# Patient Record
Sex: FEMALE | Race: WHITE | NOT HISPANIC OR LATINO | Employment: FULL TIME | URBAN - NONMETROPOLITAN AREA
[De-identification: names, ages, dates, MRNs, and addresses within clinical notes are randomized per-mention and may not be internally consistent; named-entity substitution may affect disease eponyms.]

---

## 2017-01-03 ENCOUNTER — OFFICE VISIT (OUTPATIENT)
Dept: OBSTETRICS AND GYNECOLOGY | Facility: CLINIC | Age: 37
End: 2017-01-03

## 2017-01-03 VITALS
BODY MASS INDEX: 22.45 KG/M2 | WEIGHT: 122 LBS | DIASTOLIC BLOOD PRESSURE: 80 MMHG | HEIGHT: 62 IN | SYSTOLIC BLOOD PRESSURE: 106 MMHG

## 2017-01-03 DIAGNOSIS — Z01.419 WELL WOMAN EXAM WITH ROUTINE GYNECOLOGICAL EXAM: Primary | ICD-10-CM

## 2017-01-03 DIAGNOSIS — Z30.49 ENCOUNTER FOR SURVEILLANCE OF OTHER CONTRACEPTIVE: ICD-10-CM

## 2017-01-03 DIAGNOSIS — F32.A DEPRESSION, UNSPECIFIED DEPRESSION TYPE: ICD-10-CM

## 2017-01-03 PROCEDURE — 87624 HPV HI-RISK TYP POOLED RSLT: CPT | Performed by: NURSE PRACTITIONER

## 2017-01-03 PROCEDURE — 99395 PREV VISIT EST AGE 18-39: CPT | Performed by: NURSE PRACTITIONER

## 2017-01-03 PROCEDURE — G0123 SCREEN CERV/VAG THIN LAYER: HCPCS | Performed by: NURSE PRACTITIONER

## 2017-01-03 RX ORDER — NORGESTIMATE AND ETHINYL ESTRADIOL 0.25-0.035
1 KIT ORAL DAILY
Qty: 1 TABLET | Refills: 12 | Status: SHIPPED | OUTPATIENT
Start: 2017-01-03 | End: 2017-10-09 | Stop reason: SDUPTHER

## 2017-01-03 RX ORDER — SERTRALINE HYDROCHLORIDE 25 MG/1
25 TABLET, FILM COATED ORAL DAILY
Qty: 30 TABLET | Refills: 11 | Status: SHIPPED | OUTPATIENT
Start: 2017-01-03 | End: 2017-11-29

## 2017-01-03 RX ORDER — MELATONIN
3000 DAILY
COMMUNITY

## 2017-01-03 NOTE — PROGRESS NOTES
Subjective        Amanda is a 36 y.o. female who presents for annual exam. The patient has no complaints today. The patient is sexually active. GYN screening history: . Amanda does  BSE and exercises.      . The patient wears seatbelts: yes. Amanda is doing well with BCP  And Zolft and wishes to continue both.  She is having  No side effects.        Menstrual History:  Cyclic menses          Sexually active, , male partner    Patient's last menstrual period was 12/26/2016.       The following portions of the patient's history were reviewed and updated as appropriate: allergies, current medications, past family history, past medical history, past social history, past surgical history and problem list.    Review of Systems   Constitutional: Negative for appetite change and diaphoresis.   HENT:   Negative for hearing loss and nosebleeds.    Eyes: Negative for eye problems.   Cardiovascular: Negative for chest pain and leg swelling.   Gastrointestinal: Negative for abdominal distention, blood in stool and diarrhea.   Endocrine: Negative for hot flashes.   Genitourinary: Negative for difficulty urinating and nocturia.    Musculoskeletal: Negative for arthralgias and flank pain.   Skin: Negative for itching and rash.   Neurological: Negative for numbness and seizures.   Hematological: Negative for adenopathy.   Psychiatric/Behavioral: Positive for depression (improved with Zoloft).            ObjectiveHead: Normocephalic, without obvious abnormality, atraumatic Neck: no adenopathy, no carotid bruit, no JVD, supple, symmetrical, trachea midline and thyroid not enlarged, symmetric, no tenderness/mass/nodules Physical Exam   Constitutional: She is oriented to person, place, and time. She appears well-developed and well-nourished. No distress.   Genitourinary: Vagina normal and uterus normal. Pelvic exam was performed with patient supine. There is no rash, tenderness or lesion on the right labia. There is no  "rash, tenderness or lesion on the left labia. No erythema in the vagina. No signs of injury around the vagina. No vaginal discharge found. Right adnexum does not display tenderness and palpable. Left adnexum does not display tenderness and palpable.   Cervix is not nulliparous. Cervix does not exhibit motion tenderness, discharge or friability.     Uterus is midaxial. Uterus is not exhibiting a mass or mobile. Rectal exam shows no mass.   Genitourinary Comments: BSU  Without lesions  Perineum is notmal and well supported  Urethra with lesions  Bladder nontender  Labia minora and  Majora  Normal wihout lesions   HENT:   Head: Normocephalic.   Eyes: Right eye exhibits no discharge. Left eye exhibits no discharge.   Neck: Neck supple. No thyromegaly present.   Cardiovascular: Normal rate.  Exam reveals no friction rub.    No murmur heard.  Pulmonary/Chest: Effort normal. No respiratory distress. She has no wheezes. Right breast exhibits no inverted nipple and no mass. Left breast exhibits no inverted nipple and no mass.   Abdominal: Soft. She exhibits no distension. There is no tenderness.   Musculoskeletal: She exhibits no edema or deformity.   Neurological: She is oriented to person, place, and time. No cranial nerve deficit. Coordination normal.   Psychiatric: She has a normal mood and affect. Her behavior is normal. Thought content normal.   Nursing note and vitals reviewed.          Visit Vitals   • /80 (BP Location: Left arm, Patient Position: Sitting, Cuff Size: Adult)   • Ht 62\" (157.5 cm)   • Wt 122 lb (55.3 kg)   • LMP 12/26/2016   • BMI 22.31 kg/m2             Assessment/Plan     Amanda was seen today for gynecologic exam.    Diagnoses and all orders for this visit:    Well woman exam with routine gynecological exam  -     Liquid-based Pap Smear, Screening    Encounter for surveillance of other contraceptive  -     norgestimate-ethinyl estradiol (SPRINTEC 28) 0.25-35 MG-MCG per tablet; Take 1 tablet " by mouth Daily.        Patient is counseled re: BCP use, risks/benifits, and side effects.      Depression, unspecified depression type  -     sertraline (ZOLOFT) 25 MG tablet; Take 1 tablet by mouth Daily.      Patient is counseled re: BCP use, risks/benifits, and side effects.

## 2017-01-03 NOTE — MR AVS SNAPSHOT
Amanda Beltran   1/3/2017 11:00 AM   Office Visit    Dept Phone:  [phone]   Encounter #:  [id number]    Provider:  SCOTT Mcdowell   Department:  [facility]                Your Full Care Plan              Your Updated Medication List          This list is accurate as of: 1/3/17 11:58 AM.  Always use your most recent med list.                cholecalciferol 1000 UNITS tablet   Commonly known as:  VITAMIN D3       sertraline 25 MG tablet   Commonly known as:  ZOLOFT   Take 1 tablet by mouth Daily.       SPRINTEC 28 PO       terconazole 0.4 % vaginal cream   Commonly known as:  TERAZOL 7   Insert 1 applicator into the vagina Every Night.               You Were Diagnosed With        Codes Comments    Well woman exam with routine gynecological exam    -  Primary ICD-10-CM: Z01.419  ICD-9-CM: V72.31     Encounter for surveillance of other contraceptive     ICD-10-CM: Z30.49       Instructions     None    Patient Instructions History      Upcoming Appointments     Visit Type Date Time Department    PHYSICAL 1/3/2017 11:00 AM [facility] Signup     Our records indicate that you have an active [facility] V[facility] account.    You can view your After Visit Summary by going to [employer] and logging in with your Vator username and password.  If you don't have a Vator username and password but a parent or guardian has access to your record, the parent or guardian should login with their own Vator username and password and access your record to view the After Visit Summary.    If you have questions, you can email [email or web address] or call [phone] to talk to our Vator staff.  Remember, Vator is NOT to be used for urgent needs.  For medical emergencies, dial 911.               Other Info from Your Visit           Allergies     Sulfa Antibiotics      HIVES      Reason for Visit     Gynecologic Exam no  "problems, just finishing up period.      Vital Signs     Blood Pressure Height Weight Last Menstrual Period Body Mass Index Smoking Status    106/80 (BP Location: Left arm, Patient Position: Sitting, Cuff Size: Adult) 62\" (157.5 cm) 122 lb (55.3 kg) 12/26/2016 22.31 kg/m2 Never Smoker      Problems and Diagnoses Noted     Well woman exam with routine gynecological exam    -  Primary    Surveillance of previously prescribed contraceptive method            "

## 2017-01-11 LAB
GEN CATEG CVX/VAG CYTO-IMP: ABNORMAL
HPV REFLEX?: ABNORMAL
LAB AP CASE REPORT: ABNORMAL
LAB AP GYN ADDITIONAL INFORMATION: ABNORMAL
LAB AP GYN HPV REPORT,ADDENDUM: ABNORMAL
Lab: ABNORMAL
PATH INTERP SPEC-IMP: ABNORMAL
STAT OF ADQ CVX/VAG CYTO-IMP: ABNORMAL

## 2017-02-02 DIAGNOSIS — R53.83 FATIGUE, UNSPECIFIED TYPE: Primary | ICD-10-CM

## 2017-03-16 PROCEDURE — 88305 TISSUE EXAM BY PATHOLOGIST: CPT | Performed by: DERMATOLOGY

## 2017-03-17 ENCOUNTER — LAB REQUISITION (OUTPATIENT)
Dept: LAB | Facility: HOSPITAL | Age: 37
End: 2017-03-17

## 2017-03-17 ENCOUNTER — TELEPHONE (OUTPATIENT)
Dept: OBSTETRICS AND GYNECOLOGY | Facility: CLINIC | Age: 37
End: 2017-03-17

## 2017-03-17 DIAGNOSIS — Z00.00 ENCOUNTER FOR GENERAL ADULT MEDICAL EXAMINATION WITHOUT ABNORMAL FINDINGS: ICD-10-CM

## 2017-03-17 NOTE — TELEPHONE ENCOUNTER
Patient states that she actually forgot about labs. Will leave orders there for her to do a walk in.

## 2017-03-20 LAB
CYTO UR: NORMAL
LAB AP CASE REPORT: NORMAL
LAB AP CLINICAL INFORMATION: NORMAL
Lab: NORMAL
PATH REPORT.FINAL DX SPEC: NORMAL
PATH REPORT.GROSS SPEC: NORMAL

## 2017-04-13 ENCOUNTER — OFFICE VISIT (OUTPATIENT)
Dept: OBSTETRICS AND GYNECOLOGY | Facility: CLINIC | Age: 37
End: 2017-04-13

## 2017-04-13 ENCOUNTER — PROCEDURE VISIT (OUTPATIENT)
Dept: OBSTETRICS AND GYNECOLOGY | Facility: CLINIC | Age: 37
End: 2017-04-13

## 2017-04-13 VITALS
DIASTOLIC BLOOD PRESSURE: 64 MMHG | SYSTOLIC BLOOD PRESSURE: 104 MMHG | BODY MASS INDEX: 22.82 KG/M2 | HEIGHT: 62 IN | WEIGHT: 124 LBS

## 2017-04-13 DIAGNOSIS — R35.0 URINARY FREQUENCY: ICD-10-CM

## 2017-04-13 DIAGNOSIS — N93.8 DUB (DYSFUNCTIONAL UTERINE BLEEDING): Primary | ICD-10-CM

## 2017-04-13 DIAGNOSIS — B37.9 CANDIDIASIS: ICD-10-CM

## 2017-04-13 LAB — WET PREP GENITAL: ABNORMAL

## 2017-04-13 PROCEDURE — 87210 SMEAR WET MOUNT SALINE/INK: CPT | Performed by: NURSE PRACTITIONER

## 2017-04-13 PROCEDURE — 99213 OFFICE O/P EST LOW 20 MIN: CPT | Performed by: NURSE PRACTITIONER

## 2017-04-13 PROCEDURE — 76830 TRANSVAGINAL US NON-OB: CPT | Performed by: OBSTETRICS & GYNECOLOGY

## 2017-04-13 RX ORDER — CHLORAL HYDRATE 500 MG
CAPSULE ORAL
COMMUNITY
End: 2019-12-24

## 2017-04-13 RX ORDER — FLUCONAZOLE 150 MG/1
150 TABLET ORAL ONCE
Qty: 2 TABLET | Refills: 0 | Status: SHIPPED | OUTPATIENT
Start: 2017-04-13 | End: 2017-04-13

## 2017-04-13 NOTE — PROGRESS NOTES
Subjective   Amanda Beltran is a 36 y.o. female.     HPI Comments: Here for C/O DUB.    Menstrual Problem   This is a new problem. Pertinent negatives include no abdominal pain, anorexia, chills, fever, headaches, nausea, rash, sore throat or vomiting.   Female  Problem   The patient's primary symptoms include pelvic pain and vaginal bleeding. The patient's pertinent negatives include no genital itching, genital lesions, genital odor, genital rash, missed menses or vaginal discharge. This is a new problem. The current episode started 1 to 4 weeks ago. The problem occurs constantly. The problem has been waxing and waning. The pain is severe. The problem affects the right side. She is not pregnant. Pertinent negatives include no abdominal pain, anorexia, back pain, chills, constipation, diarrhea, discolored urine, dysuria, fever, flank pain, frequency, headaches, hematuria, joint pain, joint swelling, nausea, painful intercourse, rash, sore throat, urgency or vomiting. The vaginal bleeding is spotting. She has not been passing clots. She has not been passing tissue. Nothing aggravates the symptoms. She is sexually active. No, her partner does not have an STD. She uses oral contraceptives for contraception. Her menstrual history has been regular.       The following portions of the patient's history were reviewed and updated as appropriate: allergies, current medications, past family history, past medical history, past social history, past surgical history and problem list.    Review of Systems   Constitutional: Negative for chills and fever.   HENT: Negative for sore throat.    Gastrointestinal: Negative for abdominal pain, anorexia, constipation, diarrhea, nausea and vomiting.   Genitourinary: Positive for menstrual problem and pelvic pain. Negative for dysuria, flank pain, frequency, hematuria, missed menses, urgency and vaginal discharge.   Musculoskeletal: Negative for back pain and joint pain.   Skin: Negative  for rash.   Neurological: Negative for headaches.       Objective   Physical Exam   Constitutional: She is oriented to person, place, and time. She appears well-developed and well-nourished.   HENT:   Head: Normocephalic and atraumatic.   Abdominal: Soft. She exhibits no distension. There is no tenderness.   Genitourinary: Vagina normal and uterus normal. There is no rash, tenderness, lesion or injury on the right labia. There is no rash, tenderness, lesion or injury on the left labia. Uterus is not enlarged and not tender. Cervix exhibits no motion tenderness, no discharge and no friability. Right adnexum displays no mass, no tenderness and no fullness. Left adnexum displays no mass, no tenderness and no fullness. No erythema, tenderness or bleeding in the vagina. No vaginal discharge found.   Genitourinary Comments: Small amt of dark blood noted.   Neurological: She is alert and oriented to person, place, and time.   Skin: Skin is warm and dry.   Psychiatric: She has a normal mood and affect. Her behavior is normal. Judgment and thought content normal.   Nursing note and vitals reviewed.      Assessment/Plan   Amanda was seen today for menstrual problem.    Diagnoses and all orders for this visit:    DUB (dysfunctional uterine bleeding)  Comments:  Will get Pelvic U/S.    Candidiasis  Comments:  + Yeast on wet smear.  Orders:  -     fluconazole (DIFLUCAN) 150 MG tablet; Take 1 tablet by mouth 1 (One) Time for 1 dose. Take once a week for 2 weeks.  -     POC Wet Prep    Urinary frequency  Comments:  urine sent for U/A and C&S.  Orders:  -     Urinalysis With / Microscopic If Indicated  -     Urine Culture

## 2017-04-14 ENCOUNTER — TELEPHONE (OUTPATIENT)
Dept: OBSTETRICS AND GYNECOLOGY | Facility: CLINIC | Age: 37
End: 2017-04-14

## 2017-04-14 DIAGNOSIS — E28.2 PCOS (POLYCYSTIC OVARIAN SYNDROME): Primary | ICD-10-CM

## 2017-04-14 NOTE — TELEPHONE ENCOUNTER
Can you please let her know her U/S looks like she has PCOS. She will need to come in for fasting lab work to check her levels.

## 2017-04-15 LAB
APPEARANCE UR: CLEAR
BACTERIA #/AREA URNS HPF: NORMAL /HPF
BACTERIA UR CULT: NORMAL
BACTERIA UR CULT: NORMAL
BILIRUB UR QL STRIP: NEGATIVE
CASTS URNS MICRO: NORMAL
COLOR UR: YELLOW
EPI CELLS #/AREA URNS HPF: NORMAL /HPF
GLUCOSE UR QL: NEGATIVE
HGB UR QL STRIP: ABNORMAL
KETONES UR QL STRIP: NEGATIVE
LEUKOCYTE ESTERASE UR QL STRIP: NEGATIVE
NITRITE UR QL STRIP: NEGATIVE
PH UR STRIP: 7 [PH] (ref 5–8)
PROT UR QL STRIP: NEGATIVE
RBC #/AREA URNS HPF: NORMAL /HPF
SP GR UR: ABNORMAL (ref 1–1.03)
UROBILINOGEN UR STRIP-MCNC: ABNORMAL MG/DL
WBC #/AREA URNS HPF: NORMAL /HPF

## 2017-04-18 DIAGNOSIS — Z01.419 WELL WOMAN EXAM WITH ROUTINE GYNECOLOGICAL EXAM: Primary | ICD-10-CM

## 2017-04-19 LAB
25(OH)D3+25(OH)D2 SERPL-MCNC: 65.2 NG/ML (ref 30–100)
ALBUMIN SERPL-MCNC: 4.1 G/DL (ref 3.5–5)
ALBUMIN/GLOB SERPL: 1.5 G/DL (ref 1.1–2.5)
ALP SERPL-CCNC: 37 U/L (ref 24–120)
ALT SERPL-CCNC: 19 U/L (ref 0–54)
AST SERPL-CCNC: 30 U/L (ref 7–45)
BASOPHILS # BLD AUTO: 0.02 10*3/MM3 (ref 0–0.2)
BASOPHILS NFR BLD AUTO: 0.5 % (ref 0–2)
BILIRUB SERPL-MCNC: 0.5 MG/DL (ref 0.1–1)
BUN SERPL-MCNC: 13 MG/DL (ref 5–21)
BUN/CREAT SERPL: 18.3 (ref 7–25)
CALCIUM SERPL-MCNC: 9.7 MG/DL (ref 8.4–10.4)
CHLORIDE SERPL-SCNC: 103 MMOL/L (ref 98–110)
CHOLEST SERPL-MCNC: 163 MG/DL (ref 130–200)
CO2 SERPL-SCNC: 27 MMOL/L (ref 24–31)
CORTIS SERPL-MCNC: 26.6 UG/DL
CREAT SERPL-MCNC: 0.71 MG/DL (ref 0.5–1.4)
DHEA-S SERPL-MCNC: 115.7 UG/DL (ref 84.8–378)
EOSINOPHIL # BLD AUTO: 0.06 10*3/MM3 (ref 0–0.7)
EOSINOPHIL NFR BLD AUTO: 1.5 % (ref 0–4)
ERYTHROCYTE [DISTWIDTH] IN BLOOD BY AUTOMATED COUNT: 12.3 % (ref 12–15)
ESTRADIOL SERPL-MCNC: 9 PG/ML
FSH SERPL-ACNC: 5.8 MIU/ML
GLOBULIN SER CALC-MCNC: 2.8 GM/DL
GLUCOSE SERPL-MCNC: 86 MG/DL (ref 70–100)
HBA1C MFR BLD: 5 %
HCT VFR BLD AUTO: 40.7 % (ref 37–47)
HDLC SERPL-MCNC: 78 MG/DL
HGB BLD-MCNC: 13.4 G/DL (ref 12–16)
IMM GRANULOCYTES # BLD: 0 10*3/MM3 (ref 0–0.03)
IMM GRANULOCYTES NFR BLD: 0 % (ref 0–5)
INSULIN SERPL-ACNC: 4.5 UIU/ML (ref 2.6–24.9)
LDLC SERPL CALC-MCNC: 73 MG/DL (ref 0–99)
LDLC/HDLC SERPL: 0.93 {RATIO}
LH SERPL-ACNC: 6.5 MIU/ML
LYMPHOCYTES # BLD AUTO: 1.62 10*3/MM3 (ref 0.72–4.86)
LYMPHOCYTES NFR BLD AUTO: 40.5 % (ref 15–45)
MCH RBC QN AUTO: 30.3 PG (ref 28–32)
MCHC RBC AUTO-ENTMCNC: 32.9 G/DL (ref 33–36)
MCV RBC AUTO: 92.1 FL (ref 82–98)
MONOCYTES # BLD AUTO: 0.3 10*3/MM3 (ref 0.19–1.3)
MONOCYTES NFR BLD AUTO: 7.5 % (ref 4–12)
NEUTROPHILS # BLD AUTO: 2 10*3/MM3 (ref 1.87–8.4)
NEUTROPHILS NFR BLD AUTO: 50 % (ref 39–78)
PLATELET # BLD AUTO: 214 10*3/MM3 (ref 130–400)
POTASSIUM SERPL-SCNC: 5 MMOL/L (ref 3.5–5.3)
PROGEST SERPL-MCNC: <0.1 NG/ML
PROT SERPL-MCNC: 6.9 G/DL (ref 6.3–8.7)
RBC # BLD AUTO: 4.42 10*6/MM3 (ref 4.2–5.4)
SODIUM SERPL-SCNC: 140 MMOL/L (ref 135–145)
T3RU NFR SERPL: 24 % (ref 24–39)
T4 FREE SERPL-MCNC: 0.95 NG/DL (ref 0.78–2.19)
TESTOST SERPL-MCNC: 12 NG/DL (ref 8–48)
TRIGL SERPL-MCNC: 61 MG/DL (ref 0–149)
TSH SERPL DL<=0.005 MIU/L-ACNC: 0.83 MIU/ML (ref 0.47–4.68)
VLDLC SERPL CALC-MCNC: 12.2 MG/DL
WBC # BLD AUTO: 4 10*3/MM3 (ref 4.8–10.8)

## 2017-05-09 PROBLEM — R58 BLEEDING: Status: ACTIVE | Noted: 2017-03-27

## 2017-05-09 RX ORDER — SERTRALINE HYDROCHLORIDE 25 MG/1
TABLET, FILM COATED ORAL
COMMUNITY
End: 2017-05-09

## 2017-05-10 ENCOUNTER — OFFICE VISIT (OUTPATIENT)
Dept: OBSTETRICS AND GYNECOLOGY | Facility: CLINIC | Age: 37
End: 2017-05-10

## 2017-05-10 VITALS
WEIGHT: 124 LBS | DIASTOLIC BLOOD PRESSURE: 66 MMHG | HEIGHT: 62 IN | BODY MASS INDEX: 22.82 KG/M2 | SYSTOLIC BLOOD PRESSURE: 116 MMHG

## 2017-05-10 DIAGNOSIS — N93.8 DUB (DYSFUNCTIONAL UTERINE BLEEDING): Primary | ICD-10-CM

## 2017-05-10 PROCEDURE — 99212 OFFICE O/P EST SF 10 MIN: CPT | Performed by: NURSE PRACTITIONER

## 2017-10-09 DIAGNOSIS — Z30.49 ENCOUNTER FOR SURVEILLANCE OF OTHER CONTRACEPTIVE: ICD-10-CM

## 2017-10-09 RX ORDER — NORGESTIMATE AND ETHINYL ESTRADIOL 0.25-0.035
1 KIT ORAL DAILY
Qty: 1 TABLET | Refills: 0 | Status: SHIPPED | OUTPATIENT
Start: 2017-10-09 | End: 2018-03-07
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
## 2018-01-23 DIAGNOSIS — Z30.49 ENCOUNTER FOR SURVEILLANCE OF OTHER CONTRACEPTIVE: ICD-10-CM

## 2018-03-07 ENCOUNTER — OFFICE VISIT (OUTPATIENT)
Dept: OBSTETRICS AND GYNECOLOGY | Facility: CLINIC | Age: 38
End: 2018-03-07

## 2018-03-07 VITALS
BODY MASS INDEX: 23.75 KG/M2 | WEIGHT: 121 LBS | SYSTOLIC BLOOD PRESSURE: 104 MMHG | HEIGHT: 60 IN | DIASTOLIC BLOOD PRESSURE: 56 MMHG

## 2018-03-07 DIAGNOSIS — Z01.419 WELL WOMAN EXAM WITH ROUTINE GYNECOLOGICAL EXAM: Primary | ICD-10-CM

## 2018-03-07 DIAGNOSIS — Z30.41 ENCOUNTER FOR SURVEILLANCE OF CONTRACEPTIVE PILLS: ICD-10-CM

## 2018-03-07 PROCEDURE — 87512 GARDNER VAG DNA QUANT: CPT | Performed by: NURSE PRACTITIONER

## 2018-03-07 PROCEDURE — G0123 SCREEN CERV/VAG THIN LAYER: HCPCS | Performed by: NURSE PRACTITIONER

## 2018-03-07 PROCEDURE — 87481 CANDIDA DNA AMP PROBE: CPT | Performed by: NURSE PRACTITIONER

## 2018-03-07 PROCEDURE — 87798 DETECT AGENT NOS DNA AMP: CPT | Performed by: NURSE PRACTITIONER

## 2018-03-07 PROCEDURE — 99395 PREV VISIT EST AGE 18-39: CPT | Performed by: NURSE PRACTITIONER

## 2018-03-07 PROCEDURE — 87661 TRICHOMONAS VAGINALIS AMPLIF: CPT | Performed by: NURSE PRACTITIONER

## 2018-03-07 RX ORDER — NORGESTIMATE AND ETHINYL ESTRADIOL 0.25-0.035
1 KIT ORAL DAILY
COMMUNITY
End: 2018-03-07 | Stop reason: SDUPTHER

## 2018-03-07 RX ORDER — NORGESTIMATE AND ETHINYL ESTRADIOL 0.25-0.035
1 KIT ORAL DAILY
Qty: 28 TABLET | Refills: 12 | Status: SHIPPED | OUTPATIENT
Start: 2018-03-07 | End: 2019-03-11 | Stop reason: SDUPTHER

## 2018-03-07 NOTE — PROGRESS NOTES
"Yvette Beltran is a 37 y.o. female    HPI Comments: Here for yearly check up. Has no complaints.    Gynecologic Exam   The patient's pertinent negatives include no pelvic pain, vaginal bleeding or vaginal discharge. The patient is experiencing no pain. Pertinent negatives include no abdominal pain, anorexia, back pain, chills, constipation, diarrhea, discolored urine, dysuria, fever, flank pain, frequency, headaches, hematuria, joint pain, joint swelling, nausea, painful intercourse, rash, sore throat, urgency or vomiting. Nothing aggravates the symptoms. She is sexually active. She uses oral contraceptives for contraception. Her menstrual history has been regular.         /56  Ht 152.4 cm (60\")  Wt 54.9 kg (121 lb)  LMP 02/17/2018 (Exact Date)  BMI 23.63 kg/m2    Outpatient Encounter Prescriptions as of 3/7/2018   Medication Sig Dispense Refill   • cholecalciferol (VITAMIN D3) 1000 UNITS tablet Take 1,000 Units by mouth 3 (Three) Times a Day.     • Cyanocobalamin (VITAMIN B 12) 100 MCG lozenge Take  by mouth.     • Magnesium 100 MG capsule Take  by mouth.     • norgestimate-ethinyl estradiol (SPRINTEC 28) 0.25-35 MG-MCG per tablet Take 1 tablet by mouth Daily. 28 tablet 12   • Omega-3 Fatty Acids (FISH OIL) 1000 MG capsule capsule Take  by mouth Daily With Breakfast.     • [DISCONTINUED] Norethin-Eth Estrad-Fe Biphas (LO LOESTRIN FE PO)      • [DISCONTINUED] norgestimate-ethinyl estradiol (SPRINTEC 28) 0.25-35 MG-MCG per tablet Take 1 tablet by mouth Daily.     • [DISCONTINUED] cyclobenzaprine (FLEXERIL) 10 MG tablet 1 to 2 tabs q hs prn 30 tablet 0   • [DISCONTINUED] norgestimate-ethinyl estradiol (SPRINTEC 28) 0.25-35 MG-MCG per tablet Take 1 tablet by mouth Daily. 1 tablet 0   • [DISCONTINUED] SPRINTEC 28 0.25-35 MG-MCG per tablet TAKE 1 TABLET BY MOUTH DAILY. 28 tablet 12   • [DISCONTINUED] terconazole (TERAZOL 7) 0.4 % vaginal cream Insert 1 applicator into the vagina Every Night. 7 " each 1     No facility-administered encounter medications on file as of 3/7/2018.        Surgical History  Past Surgical History:   Procedure Laterality Date   • APPENDECTOMY     • BREAST AUGMENTATION     •  SECTION     • HAND SURGERY      CUT HERSELF WITH KNIFE   • TONSILLECTOMY     • VAGINAL BIRTH AFTER  SECTION         Family History  Family History   Problem Relation Age of Onset   • Cirrhosis Mother    • No Known Problems Father    • No Known Problems Sister    • No Known Problems Brother    • Prostate cancer Maternal Grandfather    • Breast cancer Neg Hx    • Ovarian cancer Neg Hx    • Uterine cancer Neg Hx    • Colon cancer Neg Hx    • Melanoma Neg Hx        The following portions of the patient's history were reviewed and updated as appropriate: allergies, current medications, past family history, past medical history, past social history, past surgical history and problem list.    Review of Systems   Constitutional: Negative for activity change, appetite change, chills, diaphoresis, fatigue, fever and unexpected weight change.   HENT: Negative for congestion, dental problem, drooling, ear discharge, ear pain, facial swelling, hearing loss, mouth sores, nosebleeds, postnasal drip, rhinorrhea, sinus pain, sinus pressure, sneezing, sore throat, tinnitus, trouble swallowing and voice change.    Eyes: Negative for photophobia, pain, discharge, redness, itching and visual disturbance.   Respiratory: Negative for apnea, cough, choking, chest tightness, shortness of breath, wheezing and stridor.    Cardiovascular: Negative for chest pain, palpitations and leg swelling.   Gastrointestinal: Negative for abdominal distention, abdominal pain, anal bleeding, anorexia, blood in stool, constipation, diarrhea, nausea, rectal pain and vomiting.   Endocrine: Negative for cold intolerance, heat intolerance, polydipsia, polyphagia and polyuria.   Genitourinary: Negative for decreased urine volume, difficulty  urinating, dyspareunia, dysuria, enuresis, flank pain, frequency, genital sores, hematuria, menstrual problem, pelvic pain, urgency, vaginal bleeding, vaginal discharge and vaginal pain.   Musculoskeletal: Negative for arthralgias, back pain, gait problem, joint pain, joint swelling, myalgias, neck pain and neck stiffness.   Skin: Negative for color change, pallor, rash and wound.   Allergic/Immunologic: Negative for environmental allergies, food allergies and immunocompromised state.   Neurological: Negative for dizziness, tremors, seizures, syncope, facial asymmetry, speech difficulty, weakness, light-headedness, numbness and headaches.   Hematological: Negative for adenopathy. Does not bruise/bleed easily.   Psychiatric/Behavioral: Negative for agitation, behavioral problems, confusion, decreased concentration, dysphoric mood, hallucinations, self-injury, sleep disturbance and suicidal ideas. The patient is not nervous/anxious and is not hyperactive.        Objective   Physical Exam   Constitutional: She is oriented to person, place, and time. She appears well-developed and well-nourished. No distress.   HENT:   Head: Normocephalic.   Right Ear: External ear normal.   Left Ear: External ear normal.   Nose: Nose normal.   Mouth/Throat: Oropharynx is clear and moist.   Eyes: Conjunctivae are normal. Right eye exhibits no discharge. Left eye exhibits no discharge. No scleral icterus.   Neck: Normal range of motion. Neck supple. Carotid bruit is not present. No tracheal deviation present. No thyromegaly present.   Cardiovascular: Normal rate, regular rhythm, normal heart sounds and intact distal pulses.    No murmur heard.  Pulmonary/Chest: Effort normal and breath sounds normal. No respiratory distress. She has no wheezes. Right breast exhibits no inverted nipple, no mass, no nipple discharge, no skin change and no tenderness. Left breast exhibits no inverted nipple, no mass, no nipple discharge, no skin change and  no tenderness. Breasts are symmetrical. There is no breast swelling.   Abdominal: Soft. She exhibits no distension and no mass. There is no tenderness. There is no guarding. No hernia. Hernia confirmed negative in the right inguinal area and confirmed negative in the left inguinal area.   Genitourinary: Rectum normal and uterus normal. Rectal exam shows no mass. No breast tenderness, discharge or bleeding. Pelvic exam was performed with patient supine. There is no rash, tenderness, lesion or injury on the right labia. There is no rash, tenderness, lesion or injury on the left labia. Uterus is not enlarged, not fixed and not tender. Cervix exhibits no motion tenderness, no discharge and no friability. Right adnexum displays no mass, no tenderness and no fullness. Left adnexum displays no mass, no tenderness and no fullness. No erythema, tenderness or bleeding in the vagina. No foreign body in the vagina. No signs of injury around the vagina. Vaginal discharge found.   Genitourinary Comments:   BSU normal  Urethral meatus  Normal  Perineum  Normal   Musculoskeletal: Normal range of motion. She exhibits no edema or tenderness.   Lymphadenopathy:        Head (right side): No submental, no submandibular, no tonsillar, no preauricular, no posterior auricular and no occipital adenopathy present.        Head (left side): No submental, no submandibular, no tonsillar, no preauricular, no posterior auricular and no occipital adenopathy present.     She has no cervical adenopathy.        Right cervical: No superficial cervical, no deep cervical and no posterior cervical adenopathy present.       Left cervical: No superficial cervical, no deep cervical and no posterior cervical adenopathy present.     She has no axillary adenopathy.        Right: No inguinal adenopathy present.        Left: No inguinal adenopathy present.   Neurological: She is alert and oriented to person, place, and time. Coordination normal.   Skin: Skin is  warm and dry. No bruising and no rash noted. She is not diaphoretic. No erythema.   Psychiatric: She has a normal mood and affect. Her behavior is normal. Judgment and thought content normal.   Nursing note and vitals reviewed.      Assessment/Plan   Amanda was seen today for gynecologic exam.    Diagnoses and all orders for this visit:    Well woman exam with routine gynecological exam  Normal GYN exam. Will have lab work today. Encouraged SBE, pt is aware how to do self breast exam and the importance of same. Discussed weight management and importance of maintaining a healthy weight. Discussed Vitamin D intake and the importance of adequate vitamin D for both Bone Health and a healthy immune system.  Discussed Daily exercise and the importance of same, in regards to a healthy heart as well as helping to maintain her weight and improving her mental health.  BMI 23.6  M[facility] will be scheduled at [facility]. Pap smear is done per ASCCP guidelines.  -     Liquid-based Pap Smear, Screening  -     CBC & Differential  -     Comprehensive Metabolic Panel  -     Lipid Panel With LDL / HDL Ratio  -     TSH  -     T3, Uptake  -     Vitamin D 25 Hydroxy  -     T4, Free  -     Hemoglobin A1c  -     UA / M With / Rflx Culture([facility] ONLY) - Urine, Clean Catch  -     Vitamin B12  -     Mammo Screening Digital Tomosynthesis Bilateral With CAD; Future    Encounter for surveillance of contraceptive pills  Comments:  Doing well with OC's.  Orders:  -     norgestimate-ethinyl estradiol (SPRINTEC 28) 0.25-35 MG-MCG per tablet; Take 1 tablet by mouth Daily.    Vaginal Discharge  BV panel added to pap.      BMI 23.6  Patient's BMI is within normal parameters. No follow-up required.      Juli Simon, APRN  3/7/2018

## 2018-03-08 LAB
25(OH)D3+25(OH)D2 SERPL-MCNC: 73.1 NG/ML (ref 30–100)
ALBUMIN SERPL-MCNC: 4.6 G/DL (ref 3.5–5)
ALBUMIN/GLOB SERPL: 1.5 G/DL (ref 1.1–2.5)
ALP SERPL-CCNC: 42 U/L (ref 24–120)
ALT SERPL-CCNC: 24 U/L (ref 0–54)
APPEARANCE UR: CLEAR
AST SERPL-CCNC: 27 U/L (ref 7–45)
BACTERIA #/AREA URNS HPF: NORMAL /HPF
BASOPHILS # BLD AUTO: 0.05 10*3/MM3 (ref 0–0.2)
BASOPHILS NFR BLD AUTO: 0.6 % (ref 0–2)
BILIRUB SERPL-MCNC: 0.5 MG/DL (ref 0.1–1)
BILIRUB UR QL STRIP: NEGATIVE
BUN SERPL-MCNC: 13 MG/DL (ref 5–21)
BUN/CREAT SERPL: 19.4 (ref 7–25)
CALCIUM SERPL-MCNC: 10.1 MG/DL (ref 8.4–10.4)
CHLORIDE SERPL-SCNC: 101 MMOL/L (ref 98–110)
CHOLEST SERPL-MCNC: 161 MG/DL (ref 130–200)
CO2 SERPL-SCNC: 29 MMOL/L (ref 24–31)
COLOR UR: YELLOW
CREAT SERPL-MCNC: 0.67 MG/DL (ref 0.5–1.4)
EOSINOPHIL # BLD AUTO: 0.02 10*3/MM3 (ref 0–0.7)
EOSINOPHIL NFR BLD AUTO: 0.2 % (ref 0–4)
EPI CELLS #/AREA URNS HPF: NORMAL /HPF
ERYTHROCYTE [DISTWIDTH] IN BLOOD BY AUTOMATED COUNT: 11.9 % (ref 12–15)
GFR SERPLBLD CREATININE-BSD FMLA CKD-EPI: 100 ML/MIN/1.73
GFR SERPLBLD CREATININE-BSD FMLA CKD-EPI: 121 ML/MIN/1.73
GLOBULIN SER CALC-MCNC: 3 GM/DL
GLUCOSE SERPL-MCNC: 83 MG/DL (ref 70–100)
GLUCOSE UR QL: NEGATIVE
HBA1C MFR BLD: 4.7 %
HCT VFR BLD AUTO: 42.6 % (ref 37–47)
HDLC SERPL-MCNC: 86 MG/DL
HGB BLD-MCNC: 13.8 G/DL (ref 12–16)
HGB UR QL STRIP: NEGATIVE
IMM GRANULOCYTES # BLD: 0.02 10*3/MM3 (ref 0–0.03)
IMM GRANULOCYTES NFR BLD: 0.2 % (ref 0–5)
KETONES UR QL STRIP: NEGATIVE
LDLC SERPL CALC-MCNC: 55 MG/DL (ref 0–99)
LDLC/HDLC SERPL: 0.64 {RATIO}
LEUKOCYTE ESTERASE UR QL STRIP: NEGATIVE
LYMPHOCYTES # BLD AUTO: 1.63 10*3/MM3 (ref 0.72–4.86)
LYMPHOCYTES NFR BLD AUTO: 19.5 % (ref 15–45)
MCH RBC QN AUTO: 30.4 PG (ref 28–32)
MCHC RBC AUTO-ENTMCNC: 32.4 G/DL (ref 33–36)
MCV RBC AUTO: 93.8 FL (ref 82–98)
MICRO URNS: NORMAL
MICRO URNS: NORMAL
MONOCYTES # BLD AUTO: 0.43 10*3/MM3 (ref 0.19–1.3)
MONOCYTES NFR BLD AUTO: 5.1 % (ref 4–12)
MUCOUS THREADS URNS QL MICRO: PRESENT /HPF
NEUTROPHILS # BLD AUTO: 6.22 10*3/MM3 (ref 1.87–8.4)
NEUTROPHILS NFR BLD AUTO: 74.4 % (ref 39–78)
NITRITE UR QL STRIP: NEGATIVE
NRBC BLD AUTO-RTO: 0 /100 WBC (ref 0–0)
PH UR STRIP: 7.5 [PH] (ref 5–7.5)
PLATELET # BLD AUTO: 271 10*3/MM3 (ref 130–400)
POTASSIUM SERPL-SCNC: 4.3 MMOL/L (ref 3.5–5.3)
PROT SERPL-MCNC: 7.6 G/DL (ref 6.3–8.7)
PROT UR QL STRIP: NEGATIVE
RBC # BLD AUTO: 4.54 10*6/MM3 (ref 4.2–5.4)
RBC #/AREA URNS HPF: NORMAL /HPF
SODIUM SERPL-SCNC: 141 MMOL/L (ref 135–145)
SP GR UR: 1.02 (ref 1–1.03)
T3RU NFR SERPL: 25 % (ref 24–39)
T4 FREE SERPL-MCNC: 1.29 NG/DL (ref 0.78–2.19)
TRIGL SERPL-MCNC: 101 MG/DL (ref 0–149)
TSH SERPL DL<=0.005 MIU/L-ACNC: 0.66 MIU/ML (ref 0.47–4.68)
URINALYSIS REFLEX: NORMAL
UROBILINOGEN UR STRIP-MCNC: 0.2 MG/DL (ref 0.2–1)
VIT B12 SERPL-MCNC: 990 PG/ML (ref 239–931)
VLDLC SERPL CALC-MCNC: 20.2 MG/DL
WBC # BLD AUTO: 8.37 10*3/MM3 (ref 4.8–10.8)
WBC #/AREA URNS HPF: NORMAL /HPF

## 2018-03-11 LAB
GEN CATEG CVX/VAG CYTO-IMP: NORMAL
LAB AP CASE REPORT: NORMAL
LAB AP GYN ADDITIONAL INFORMATION: NORMAL
LAB AP GYN OTHER FINDINGS: NORMAL
Lab: NORMAL
PATH INTERP SPEC-IMP: NORMAL
STAT OF ADQ CVX/VAG CYTO-IMP: NORMAL

## 2018-03-12 ENCOUNTER — HOSPITAL ENCOUNTER (OUTPATIENT)
Dept: MAMMOGRAPHY | Facility: HOSPITAL | Age: 38
Discharge: HOME | End: 2018-03-12
Admitting: NURSE PRACTITIONER

## 2018-03-12 DIAGNOSIS — Z01.419 WELL WOMAN EXAM WITH ROUTINE GYNECOLOGICAL EXAM: ICD-10-CM

## 2018-03-12 PROCEDURE — 77063 BREAST TOMOSYNTHESIS BI: CPT

## 2018-03-12 PROCEDURE — 77067 SCR MAMMO BI INCL CAD: CPT

## 2019-03-11 ENCOUNTER — OFFICE VISIT (OUTPATIENT)
Dept: OBSTETRICS AND GYNECOLOGY | Facility: CLINIC | Age: 39
End: 2019-03-11

## 2019-03-11 VITALS
SYSTOLIC BLOOD PRESSURE: 118 MMHG | WEIGHT: 122 LBS | BODY MASS INDEX: 23.95 KG/M2 | DIASTOLIC BLOOD PRESSURE: 70 MMHG | HEIGHT: 60 IN

## 2019-03-11 DIAGNOSIS — Z30.41 ENCOUNTER FOR SURVEILLANCE OF CONTRACEPTIVE PILLS: ICD-10-CM

## 2019-03-11 DIAGNOSIS — Z01.419 WELL WOMAN EXAM WITH ROUTINE GYNECOLOGICAL EXAM: Primary | ICD-10-CM

## 2019-03-11 DIAGNOSIS — Z12.4 CERVICAL CANCER SCREENING: ICD-10-CM

## 2019-03-11 DIAGNOSIS — E55.9 VITAMIN D DEFICIENCY: ICD-10-CM

## 2019-03-11 DIAGNOSIS — R53.83 FATIGUE, UNSPECIFIED TYPE: ICD-10-CM

## 2019-03-11 DIAGNOSIS — R10.32 LLQ PAIN: ICD-10-CM

## 2019-03-11 DIAGNOSIS — N76.0 ACUTE VAGINITIS: ICD-10-CM

## 2019-03-11 PROBLEM — M26.609 TEMPOROMANDIBULAR JOINT DISORDER: Status: ACTIVE | Noted: 2018-06-12

## 2019-03-11 PROBLEM — R58 BLEEDING: Status: RESOLVED | Noted: 2017-03-27 | Resolved: 2019-03-11

## 2019-03-11 PROBLEM — F63.3 TRICHOTILLOMANIA: Status: ACTIVE | Noted: 2019-03-11

## 2019-03-11 PROCEDURE — 87624 HPV HI-RISK TYP POOLED RSLT: CPT | Performed by: NURSE PRACTITIONER

## 2019-03-11 PROCEDURE — 99213 OFFICE O/P EST LOW 20 MIN: CPT | Performed by: NURSE PRACTITIONER

## 2019-03-11 PROCEDURE — G0123 SCREEN CERV/VAG THIN LAYER: HCPCS | Performed by: NURSE PRACTITIONER

## 2019-03-11 PROCEDURE — 87798 DETECT AGENT NOS DNA AMP: CPT | Performed by: NURSE PRACTITIONER

## 2019-03-11 PROCEDURE — 87512 GARDNER VAG DNA QUANT: CPT | Performed by: NURSE PRACTITIONER

## 2019-03-11 PROCEDURE — 87481 CANDIDA DNA AMP PROBE: CPT | Performed by: NURSE PRACTITIONER

## 2019-03-11 PROCEDURE — 99395 PREV VISIT EST AGE 18-39: CPT | Performed by: NURSE PRACTITIONER

## 2019-03-11 PROCEDURE — 87661 TRICHOMONAS VAGINALIS AMPLIF: CPT | Performed by: NURSE PRACTITIONER

## 2019-03-11 RX ORDER — MULTIPLE VITAMINS W/ MINERALS TAB 9MG-400MCG
1 TAB ORAL DAILY
COMMUNITY

## 2019-03-11 RX ORDER — NORGESTIMATE AND ETHINYL ESTRADIOL 0.25-0.035
1 KIT ORAL DAILY
Qty: 28 TABLET | Refills: 12 | Status: SHIPPED | OUTPATIENT
Start: 2019-03-11 | End: 2020-01-17 | Stop reason: SDUPTHER

## 2019-03-11 NOTE — PROGRESS NOTES
Yvette Beltran is a 38 y.o. female  YOB: 1980        Chief Complaint   Patient presents with   • Gynecologic Exam     Est patient, new to [place],  here for yearly exam. Patient's last pap was done on 03/07/18. Patient is on Sprintec OCP and doing well with it. Patient needing refills of this sent to her pharmacy. Patient voices no complaints or concerns today.        Gynecologic Exam   The patient's pertinent negatives include no pelvic pain. Pertinent negatives include no abdominal pain, back pain, constipation, diarrhea, dysuria, fever, frequency, hematuria, nausea, rash, sore throat, urgency or vomiting.       The following portions of the patient's history were reviewed and updated as appropriate: allergies, current medications, past family history, past medical history, past social history, past surgical history and problem list.    Allergies   Allergen Reactions   • Sulfa Antibiotics Hives     HIVES         Past Medical History:   Diagnosis Date   • Irritable bowel disease    • Lactose intolerance        Family History   Problem Relation Age of Onset   • Cirrhosis Mother    • No Known Problems Father    • No Known Problems Sister    • No Known Problems Brother    • Prostate cancer Maternal Grandfather    • Breast cancer Maternal Aunt    • Ovarian cancer Neg Hx    • Uterine cancer Neg Hx    • Colon cancer Neg Hx    • Melanoma Neg Hx        Social History     Socioeconomic History   • Marital status:      Spouse name: Not on file   • Number of children: Not on file   • Years of education: Not on file   • Highest education level: Not on file   Social Needs   • Financial resource strain: Not on file   • Food insecurity - worry: Not on file   • Food insecurity - inability: Not on file   • Transportation needs - medical: Not on file   • Transportation needs - non-medical: Not on file   Occupational History   • Not on file   Tobacco Use   • Smoking status: Never Smoker   •  Smokeless tobacco: Never Used   Substance and Sexual Activity   • Alcohol use: Yes     Comment: occassional   • Drug use: No   • Sexual activity: Yes     Birth control/protection: OCP   Other Topics Concern   • Not on file   Social History Narrative   • Not on file         Current Outpatient Medications:   •  cholecalciferol (VITAMIN D3) 1000 UNITS tablet, Take 1,000 Units by mouth 3 (Three) Times a Day., Disp: , Rfl:   •  Cyanocobalamin (VITAMIN B 12) 100 MCG lozenge, Take  by mouth., Disp: , Rfl:   •  Magnesium 100 MG capsule, Take  by mouth., Disp: , Rfl:   •  Multiple Vitamins-Minerals (MULTIVITAMIN WITH MINERALS) tablet tablet, Take 1 tablet by mouth Daily., Disp: , Rfl:   •  Omega-3 Fatty Acids (FISH OIL) 1000 MG capsule capsule, Take  by mouth Daily With Breakfast., Disp: , Rfl:   •  norgestimate-ethinyl estradiol (SPRINTEC 28) 0.25-35 MG-MCG per tablet, Take 1 tablet by mouth Daily., Disp: 28 tablet, Rfl: 12    No LMP recorded. Patient is not currently having periods (Reason: Oral contraceptives).    Sexual History:         Could not be calculated    Past Surgical History:   Procedure Laterality Date   • APPENDECTOMY     • AUGMENTATION MAMMAPLASTY     • BREAST AUGMENTATION     •  SECTION     • HAND SURGERY      CUT HERSELF WITH KNIFE   • TONSILLECTOMY     • VAGINAL BIRTH AFTER  SECTION         Review of Systems   Constitutional: Negative for activity change, appetite change, fatigue, fever, unexpected weight gain and unexpected weight loss.   HENT: Negative for congestion, ear pain, hearing loss, nosebleeds, rhinorrhea, sore throat, tinnitus and trouble swallowing.    Eyes: Negative for blurred vision, pain, discharge, itching and visual disturbance.   Respiratory: Negative for apnea, chest tightness, shortness of breath and wheezing.    Cardiovascular: Negative for chest pain and leg swelling.   Gastrointestinal: Negative for abdominal pain, blood in stool, constipation, diarrhea,  nausea, vomiting and GERD.   Endocrine: Negative for heat intolerance, polydipsia and polyuria.   Genitourinary: Negative for urinary incontinence, decreased libido, difficulty urinating, dyspareunia, dysuria, frequency, genital sores, hematuria, menstrual problem, pelvic pain, urgency, vaginal pain and breast lump.   Musculoskeletal: Negative for arthralgias, back pain, joint swelling and myalgias.   Skin: Negative for color change, rash and skin lesions.   Allergic/Immunologic: Negative for environmental allergies, food allergies and immunocompromised state.   Neurological: Negative for dizziness, tremors, seizures, syncope, facial asymmetry, numbness and headache.   Hematological: Negative for adenopathy. Does not bruise/bleed easily.   Psychiatric/Behavioral: Negative for agitation, hallucinations, sleep disturbance, suicidal ideas and depressed mood. The patient is not nervous/anxious.        Objective   Physical Exam   Constitutional: She is oriented to person, place, and time. She appears well-developed and well-nourished. No distress.   HENT:   Head: Normocephalic.   Right Ear: External ear normal.   Left Ear: External ear normal.   Nose: Nose normal.   Mouth/Throat: Oropharynx is clear and moist.   Eyes: Conjunctivae are normal. Right eye exhibits no discharge. Left eye exhibits no discharge. No scleral icterus.   Neck: Normal range of motion. Neck supple. Carotid bruit is not present. No tracheal deviation present. No thyromegaly present.   Cardiovascular: Normal rate, regular rhythm, normal heart sounds and intact distal pulses.   No murmur heard.  Pulmonary/Chest: Effort normal and breath sounds normal. No respiratory distress. She has no wheezes. Right breast exhibits no inverted nipple, no mass, no nipple discharge, no skin change and no tenderness. Left breast exhibits no inverted nipple, no mass, no nipple discharge, no skin change and no tenderness. Breasts are symmetrical. There is no breast  swelling.   Abdominal: Soft. She exhibits no distension and no mass. There is no tenderness. There is no guarding. No hernia. Hernia confirmed negative in the right inguinal area and confirmed negative in the left inguinal area.   Genitourinary: Rectum normal, vagina normal and uterus normal. Rectal exam shows no mass. No breast tenderness, discharge or bleeding. Pelvic exam was performed with patient supine. There is no rash, tenderness, lesion or injury on the right labia. There is no rash, tenderness, lesion or injury on the left labia. Uterus is not enlarged, not fixed and not tender. Cervix exhibits no motion tenderness, no discharge and no friability. Right adnexum displays no mass, no tenderness and no fullness. Left adnexum displays no mass, no tenderness and no fullness. No erythema, tenderness or bleeding in the vagina. No foreign body in the vagina. No signs of injury around the vagina. No vaginal discharge found.   Genitourinary Comments:   BSU normal  Urethral meatus  Normal  Perineum  Normal   Musculoskeletal: Normal range of motion. She exhibits no edema or tenderness.   Lymphadenopathy:        Head (right side): No submental, no submandibular, no tonsillar, no preauricular, no posterior auricular and no occipital adenopathy present.        Head (left side): No submental, no submandibular, no tonsillar, no preauricular, no posterior auricular and no occipital adenopathy present.     She has no cervical adenopathy.        Right cervical: No superficial cervical, no deep cervical and no posterior cervical adenopathy present.       Left cervical: No superficial cervical, no deep cervical and no posterior cervical adenopathy present.     She has no axillary adenopathy.        Right: No inguinal adenopathy present.        Left: No inguinal adenopathy present.   Neurological: She is alert and oriented to person, place, and time. Coordination normal.   Skin: Skin is warm and dry. No bruising and no rash  "noted. She is not diaphoretic. No erythema.   Psychiatric: She has a normal mood and affect. Her behavior is normal. Judgment and thought content normal.   Nursing note and vitals reviewed.        Vitals:    03/11/19 0839   BP: 118/70   Weight: 55.3 kg (122 lb)   Height: 152.4 cm (60\")       Amanda was seen today for gynecologic exam.    Diagnoses and all orders for this visit:    Well woman exam with routine gynecological exam  Comments:  Normal well woman exam.  Thin prep pap smear done.    Orders:  -     Liquid-based Pap Smear, Screening  -     Vitamin D 25 Hydroxy  -     T4, Free  -     Hemoglobin A1c  -     CBC & Differential  -     Comprehensive Metabolic Panel  -     Lipid Panel With LDL / HDL Ratio  -     TSH    Cervical cancer screening  Comments:  Thin prep pap smear done.    Orders:  -     Liquid-based Pap Smear, Screening    Encounter for surveillance of contraceptive pills  Comments:  Doing well with OC's.  Orders:  -     norgestimate-ethinyl estradiol (SPRINTEC 28) 0.25-35 MG-MCG per tablet; Take 1 tablet by mouth Daily.    Acute vaginitis  Comments:  Patient reports she took 2 Diflucan 4 days ago but states she is still symptomatic.  BV panel added to thin prep pap smear - will f/u pending results.     Fatigue, unspecified type  Comments:  Yearly lab panel done.    Orders:  -     Vitamin D 25 Hydroxy  -     T4, Free  -     Hemoglobin A1c  -     CBC & Differential  -     Comprehensive Metabolic Panel  -     Lipid Panel With LDL / HDL Ratio  -     TSH    Vitamin D deficiency  Comments:  Vitamin D level to be rechecked today.   Orders:  -     Vitamin D 25 Hydroxy    LLQ pain  Comments:  Left ovary enlarged and tender.  To get pelvic US and will f/u pending results.           Patient's Body mass index is 23.83 kg/m². BMI is within normal parameters. No follow-up required..             Non-Smoker    MyChart Instructions Given       "

## 2019-03-12 LAB
HPV I/H RISK 4 DNA CVX QL PROBE+SIG AMP: NOT DETECTED
TRICHOMONAS VAGINALIS PCR: NOT DETECTED

## 2019-03-14 LAB
GEN CATEG CVX/VAG CYTO-IMP: ABNORMAL
LAB AP CASE REPORT: ABNORMAL
LAB AP GYN ADDITIONAL INFORMATION: ABNORMAL
LAB AP GYN OTHER FINDINGS: ABNORMAL
PATH INTERP SPEC-IMP: ABNORMAL
STAT OF ADQ CVX/VAG CYTO-IMP: ABNORMAL

## 2019-03-27 ENCOUNTER — OFFICE VISIT (OUTPATIENT)
Dept: OBSTETRICS AND GYNECOLOGY | Facility: CLINIC | Age: 39
End: 2019-03-27

## 2019-03-27 ENCOUNTER — PROCEDURE VISIT (OUTPATIENT)
Dept: OBSTETRICS AND GYNECOLOGY | Facility: CLINIC | Age: 39
End: 2019-03-27

## 2019-03-27 VITALS
SYSTOLIC BLOOD PRESSURE: 118 MMHG | BODY MASS INDEX: 23.95 KG/M2 | HEIGHT: 60 IN | DIASTOLIC BLOOD PRESSURE: 70 MMHG | WEIGHT: 122 LBS

## 2019-03-27 DIAGNOSIS — R10.32 LLQ PAIN: Primary | ICD-10-CM

## 2019-03-27 PROCEDURE — 99213 OFFICE O/P EST LOW 20 MIN: CPT | Performed by: NURSE PRACTITIONER

## 2019-03-27 PROCEDURE — 76830 TRANSVAGINAL US NON-OB: CPT | Performed by: OBSTETRICS & GYNECOLOGY

## 2019-03-27 NOTE — PROGRESS NOTES
Yvette Beltran is a 38 y.o. female  YOB: 1980      Chief Complaint   Patient presents with   • Pelvic Pain     Patient here for follow up on LLQ pain and enlarged ovary on pelvic exam on 03/11/19. Patient had US today.        Pelvic Pain   The patient's pertinent negatives include no pelvic pain or vaginal discharge. Pertinent negatives include no abdominal pain, back pain, constipation, diarrhea, dysuria, fever, frequency, headaches, nausea, rash, sore throat, urgency or vomiting.       The following portions of the patient's history were reviewed and updated as appropriate: allergies, current medications, past family history, past medical history, past social history, past surgical history and problem list.    Allergies   Allergen Reactions   • Sulfa Antibiotics Hives     HIVES         Past Medical History:   Diagnosis Date   • Irritable bowel disease    • Lactose intolerance        Family History   Problem Relation Age of Onset   • Cirrhosis Mother    • No Known Problems Father    • No Known Problems Sister    • No Known Problems Brother    • Prostate cancer Maternal Grandfather    • Breast cancer Maternal Aunt    • Ovarian cancer Neg Hx    • Uterine cancer Neg Hx    • Colon cancer Neg Hx    • Melanoma Neg Hx        Social History     Socioeconomic History   • Marital status:      Spouse name: Not on file   • Number of children: Not on file   • Years of education: Not on file   • Highest education level: Not on file   Tobacco Use   • Smoking status: Never Smoker   • Smokeless tobacco: Never Used   Substance and Sexual Activity   • Alcohol use: Yes     Comment: occassional   • Drug use: No   • Sexual activity: Yes     Birth control/protection: OCP         Current Outpatient Medications:   •  cholecalciferol (VITAMIN D3) 1000 UNITS tablet, Take 1,000 Units by mouth 3 (Three) Times a Day., Disp: , Rfl:   •  Cyanocobalamin (VITAMIN B 12) 100 MCG lozenge, Take  by mouth., Disp: ,  Rfl:   •  Magnesium 100 MG capsule, Take  by mouth., Disp: , Rfl:   •  Multiple Vitamins-Minerals (MULTIVITAMIN WITH MINERALS) tablet tablet, Take 1 tablet by mouth Daily., Disp: , Rfl:   •  norgestimate-ethinyl estradiol (SPRINTEC 28) 0.25-35 MG-MCG per tablet, Take 1 tablet by mouth Daily., Disp: 28 tablet, Rfl: 12  •  Omega-3 Fatty Acids (FISH OIL) 1000 MG capsule capsule, Take  by mouth Daily With Breakfast., Disp: , Rfl:     No LMP recorded. Patient is not currently having periods (Reason: Oral contraceptives).    Sexual History:         Could not be calculated    Past Surgical History:   Procedure Laterality Date   • APPENDECTOMY     • AUGMENTATION MAMMAPLASTY     • BREAST AUGMENTATION     •  SECTION     • HAND SURGERY      CUT HERSELF WITH KNIFE   • TONSILLECTOMY     • VAGINAL BIRTH AFTER  SECTION         Review of Systems   Constitutional: Negative for activity change, appetite change, fatigue and fever.   HENT: Negative for ear pain, hearing loss, sore throat and trouble swallowing.    Eyes: Negative for pain, discharge and visual disturbance.   Respiratory: Negative for apnea, cough, chest tightness and shortness of breath.    Cardiovascular: Negative for chest pain and palpitations.   Gastrointestinal: Negative for abdominal distention, abdominal pain, blood in stool, constipation, diarrhea, nausea and vomiting.   Endocrine: Negative for heat intolerance, polydipsia and polyuria.   Genitourinary: Negative for difficulty urinating, dyspareunia, dysuria, frequency, menstrual problem, pelvic pain, urgency, vaginal bleeding, vaginal discharge and vaginal pain.   Musculoskeletal: Negative for arthralgias, back pain, joint swelling and myalgias.   Skin: Negative for rash and wound.   Allergic/Immunologic: Negative for environmental allergies and immunocompromised state.   Neurological: Negative for dizziness, tremors, seizures, numbness and headaches.   Hematological: Negative for  "adenopathy. Does not bruise/bleed easily.   Psychiatric/Behavioral: Negative for agitation, confusion and sleep disturbance. The patient is not nervous/anxious.        Objective   Physical Exam   Constitutional: She is oriented to person, place, and time. She appears well-developed and well-nourished.   HENT:   Head: Normocephalic.   Eyes: Pupils are equal, round, and reactive to light.   Neck: Normal range of motion.   Cardiovascular: Normal rate and regular rhythm.   Pulmonary/Chest: Effort normal and breath sounds normal.   Abdominal: Soft.   Musculoskeletal: Normal range of motion.   Neurological: She is alert and oriented to person, place, and time.   Skin: Skin is warm and dry.   Psychiatric: She has a normal mood and affect. Her behavior is normal.   Nursing note and vitals reviewed.        Vitals:    03/27/19 1136   BP: 118/70   Weight: 55.3 kg (122 lb)   Height: 152.4 cm (60\")       Amanda was seen today for pelvic pain.    Diagnoses and all orders for this visit:    LLQ pain  Comments:  US done today was completely normal.  Patient states she is no longer having pain in the LLQ.  RTO for yearly or sooner prn.           Patient's Body mass index is 23.83 kg/m². BMI is within normal parameters. No follow-up required..             Non-Smoker    MyChart Instructions Given       "

## 2020-01-17 DIAGNOSIS — Z30.41 ENCOUNTER FOR SURVEILLANCE OF CONTRACEPTIVE PILLS: ICD-10-CM

## 2020-01-17 RX ORDER — NORGESTIMATE AND ETHINYL ESTRADIOL 0.25-0.035
1 KIT ORAL DAILY
Qty: 28 TABLET | Refills: 2 | Status: SHIPPED | OUTPATIENT
Start: 2020-01-17 | End: 2020-03-16 | Stop reason: SDUPTHER

## 2020-03-16 ENCOUNTER — TELEPHONE (OUTPATIENT)
Dept: OBSTETRICS AND GYNECOLOGY | Facility: CLINIC | Age: 40
End: 2020-03-16

## 2020-03-16 DIAGNOSIS — Z30.41 ENCOUNTER FOR SURVEILLANCE OF CONTRACEPTIVE PILLS: ICD-10-CM

## 2020-03-16 RX ORDER — NORGESTIMATE AND ETHINYL ESTRADIOL 0.25-0.035
1 KIT ORAL DAILY
Qty: 28 TABLET | Refills: 2 | Status: SHIPPED | OUTPATIENT
Start: 2020-03-16 | End: 2020-05-19 | Stop reason: SDUPTHER

## 2020-03-25 PROCEDURE — 87635 SARS-COV-2 COVID-19 AMP PRB: CPT | Performed by: NURSE PRACTITIONER

## 2020-03-25 PROCEDURE — U0003 INFECTIOUS AGENT DETECTION BY NUCLEIC ACID (DNA OR RNA); SEVERE ACUTE RESPIRATORY SYNDROME CORONAVIRUS 2 (SARS-COV-2) (CORONAVIRUS DISEASE [COVID-19]), AMPLIFIED PROBE TECHNIQUE, MAKING USE OF HIGH THROUGHPUT TECHNOLOGIES AS DESCRIBED BY CMS-2020-01-R: HCPCS | Performed by: NURSE PRACTITIONER

## 2020-03-29 ENCOUNTER — TELEPHONE (OUTPATIENT)
Dept: URGENT CARE | Facility: CLINIC | Age: 40
End: 2020-03-29

## 2020-03-29 NOTE — TELEPHONE ENCOUNTER
PC to patient to notify of negative Covid test results. May end self quarantine since clinically improved. Continue social distancing.Virtual follow up or call as needed for worsening symptoms.

## 2020-05-19 ENCOUNTER — OFFICE VISIT (OUTPATIENT)
Dept: OBSTETRICS AND GYNECOLOGY | Facility: CLINIC | Age: 40
End: 2020-05-19

## 2020-05-19 VITALS
BODY MASS INDEX: 26.9 KG/M2 | SYSTOLIC BLOOD PRESSURE: 118 MMHG | DIASTOLIC BLOOD PRESSURE: 68 MMHG | HEIGHT: 60 IN | WEIGHT: 137 LBS

## 2020-05-19 DIAGNOSIS — Z30.41 ENCOUNTER FOR SURVEILLANCE OF CONTRACEPTIVE PILLS: ICD-10-CM

## 2020-05-19 DIAGNOSIS — Z12.4 CERVICAL CANCER SCREENING: ICD-10-CM

## 2020-05-19 DIAGNOSIS — R19.8 CHANGE IN BOWEL FUNCTION: ICD-10-CM

## 2020-05-19 DIAGNOSIS — Z01.419 WELL WOMAN EXAM WITH ROUTINE GYNECOLOGICAL EXAM: Primary | ICD-10-CM

## 2020-05-19 PROCEDURE — 99395 PREV VISIT EST AGE 18-39: CPT | Performed by: NURSE PRACTITIONER

## 2020-05-19 PROCEDURE — 87481 CANDIDA DNA AMP PROBE: CPT | Performed by: NURSE PRACTITIONER

## 2020-05-19 PROCEDURE — 87798 DETECT AGENT NOS DNA AMP: CPT | Performed by: NURSE PRACTITIONER

## 2020-05-19 PROCEDURE — 87624 HPV HI-RISK TYP POOLED RSLT: CPT | Performed by: NURSE PRACTITIONER

## 2020-05-19 PROCEDURE — 87512 GARDNER VAG DNA QUANT: CPT | Performed by: NURSE PRACTITIONER

## 2020-05-19 PROCEDURE — G0123 SCREEN CERV/VAG THIN LAYER: HCPCS | Performed by: NURSE PRACTITIONER

## 2020-05-19 PROCEDURE — 87661 TRICHOMONAS VAGINALIS AMPLIF: CPT | Performed by: NURSE PRACTITIONER

## 2020-05-19 PROCEDURE — 87563 M. GENITALIUM AMP PROBE: CPT | Performed by: NURSE PRACTITIONER

## 2020-05-19 RX ORDER — NORGESTIMATE AND ETHINYL ESTRADIOL 0.25-0.035
1 KIT ORAL DAILY
Qty: 28 TABLET | Refills: 12 | Status: SHIPPED | OUTPATIENT
Start: 2020-05-19 | End: 2021-05-25 | Stop reason: SDUPTHER

## 2020-05-19 NOTE — PROGRESS NOTES
Yvette Beltran is a 39 y.o. female  YOB: 1980        Chief Complaint   Patient presents with   • Gynecologic Exam     Patient here for yearly exam. Last exam was done 03/11/19 and was normal(ASCUS, -HPV). Patient is on Sprintec OCP and doing well with it. Patient needing refills sent to her pharmacy.  Patient has c/o of possible yeast infection. Patient also has c/o of changes in bowel and urinary habbits with bloating.        Gynecologic Exam   The patient's pertinent negatives include no pelvic pain. Associated symptoms include diarrhea. Pertinent negatives include no abdominal pain, back pain, constipation, dysuria, fever, frequency, hematuria, nausea, rash, sore throat, urgency or vomiting.       The following portions of the patient's history were reviewed and updated as appropriate: allergies, current medications, past family history, past medical history, past social history, past surgical history and problem list.    Allergies   Allergen Reactions   • Sulfa Antibiotics Hives     HIVES         Past Medical History:   Diagnosis Date   • Irritable bowel disease    • Lactose intolerance        Family History   Problem Relation Age of Onset   • Cirrhosis Mother    • No Known Problems Father    • No Known Problems Sister    • No Known Problems Brother    • Prostate cancer Maternal Grandfather    • Breast cancer Maternal Aunt    • Ovarian cancer Neg Hx    • Uterine cancer Neg Hx    • Colon cancer Neg Hx    • Melanoma Neg Hx        Social History     Socioeconomic History   • Marital status:      Spouse name: Not on file   • Number of children: Not on file   • Years of education: Not on file   • Highest education level: Not on file   Tobacco Use   • Smoking status: Never Smoker   • Smokeless tobacco: Never Used   Substance and Sexual Activity   • Alcohol use: Yes     Comment: occassional   • Drug use: No   • Sexual activity: Yes     Birth control/protection: OCP         Current  Outpatient Medications:   •  cholecalciferol (VITAMIN D3) 1000 UNITS tablet, Take 3,000 Units by mouth Daily., Disp: , Rfl:   •  cyclobenzaprine (FLEXERIL) 10 MG tablet, Take 10 mg by mouth As Needed., Disp: , Rfl:   •  Homeopathic Products (YEAST-FABRIZIO ADV HOMEOPATHIC PO), , Disp: , Rfl:   •  hydrOXYzine (ATARAX) 25 MG tablet, Take 25 mg by mouth As Needed., Disp: , Rfl:   •  Multiple Vitamins-Minerals (MULTIVITAMIN WITH MINERALS) tablet tablet, Take 1 tablet by mouth Daily., Disp: , Rfl:   •  norgestimate-ethinyl estradiol (Sprintec 28) 0.25-35 MG-MCG per tablet, Take 1 tablet by mouth Daily., Disp: 28 tablet, Rfl: 12  •  venlafaxine XR (EFFEXOR-XR) 75 MG 24 hr capsule, Take 75 mg by mouth Daily., Disp: , Rfl:   •  clonazePAM (KlonoPIN) 0.25 MG disintegrating tablet, Take 0.25 mg by mouth As Needed., Disp: , Rfl:     No LMP recorded. (Menstrual status: Oral contraceptives).    Sexual History:         Could not be calculated    Past Surgical History:   Procedure Laterality Date   • APPENDECTOMY     • AUGMENTATION MAMMAPLASTY     • BREAST AUGMENTATION     •  SECTION     • HAND SURGERY      CUT HERSELF WITH KNIFE   • TONSILLECTOMY     • VAGINAL BIRTH AFTER  SECTION         Review of Systems   Constitutional: Negative for activity change, appetite change, fatigue, fever, unexpected weight gain and unexpected weight loss.   HENT: Negative for congestion, ear pain, hearing loss, nosebleeds, rhinorrhea, sore throat, tinnitus and trouble swallowing.    Eyes: Negative for blurred vision, pain, discharge, itching and visual disturbance.   Respiratory: Negative for apnea, chest tightness, shortness of breath and wheezing.    Cardiovascular: Negative for chest pain and leg swelling.   Gastrointestinal: Positive for anal bleeding and diarrhea. Negative for abdominal pain, blood in stool, constipation, nausea, vomiting and GERD.   Endocrine: Negative for heat intolerance, polydipsia and polyuria.  "  Genitourinary: Negative for breast lump, decreased libido, difficulty urinating, dyspareunia, dysuria, frequency, genital sores, hematuria, menstrual problem, pelvic pain, urgency, urinary incontinence and vaginal pain.   Musculoskeletal: Negative for arthralgias, back pain, joint swelling and myalgias.   Skin: Negative for color change, rash and skin lesions.   Allergic/Immunologic: Negative for environmental allergies, food allergies and immunocompromised state.   Neurological: Negative for dizziness, tremors, seizures, syncope, facial asymmetry, numbness and headache.   Hematological: Negative for adenopathy. Does not bruise/bleed easily.   Psychiatric/Behavioral: Negative for agitation, hallucinations, sleep disturbance, suicidal ideas and depressed mood. The patient is not nervous/anxious.        Objective   Physical Exam   Constitutional: She is oriented to person, place, and time. She appears well-developed and well-nourished.   HENT:   Head: Normocephalic.   Eyes: Pupils are equal, round, and reactive to light.   Neck: Normal range of motion.   Cardiovascular: Normal rate and regular rhythm.   Pulmonary/Chest: Effort normal and breath sounds normal.   Abdominal: Soft.   Musculoskeletal: Normal range of motion.   Neurological: She is alert and oriented to person, place, and time.   Skin: Skin is warm and dry.   Psychiatric: She has a normal mood and affect. Her behavior is normal.   Nursing note and vitals reviewed.        Vitals:    05/19/20 0805   BP: 118/68   Weight: 62.1 kg (137 lb)   Height: 152.4 cm (60\")       Amanda was seen today for gynecologic exam.    Diagnoses and all orders for this visit:    Well woman exam with routine gynecological exam  Comments:  Normal well woman exam.  ThinPrep Pap smear done.  Orders:  -     Liquid-based Pap Smear, Screening    Cervical cancer screening  Comments:  ThinPrep Pap smear done.  Orders:  -     Liquid-based Pap Smear, Screening    Encounter for " surveillance of contraceptive pills  Comments:  Doing well on Sprintec and wants to remain on it.  Refill sent to pharmacy.  Orders:  -     norgestimate-ethinyl estradiol (Sprintec 28) 0.25-35 MG-MCG per tablet; Take 1 tablet by mouth Daily.    Change in bowel function  Comments:  Patient has been diagnosed with IBS but states a recent change in bowel function along with rectal bleeding that she believes was related to an internal hemorrhoid.  Previous patient of Dr. Gutierrez's-referral made.  Orders:  -     Ambulatory Referral to Gastroenterology          Patient's Body mass index is 26.76 kg/m². BMI is above normal parameters. Recommendations include: exercise counseling and nutrition counseling.             Non-Smoker    MyChart Instructions Given

## 2020-05-21 LAB
HPV I/H RISK 4 DNA CVX QL PROBE+SIG AMP: NOT DETECTED
TRICHOMONAS VAGINALIS PCR: NOT DETECTED

## 2020-05-26 DIAGNOSIS — B96.89 GARDNERELLA VAGINALIS INFECTION: ICD-10-CM

## 2020-05-26 DIAGNOSIS — B37.31 YEAST VAGINITIS: Primary | ICD-10-CM

## 2020-05-26 DIAGNOSIS — N76.0 GARDNERELLA VAGINALIS INFECTION: ICD-10-CM

## 2020-05-26 LAB
GEN CATEG CVX/VAG CYTO-IMP: NORMAL
LAB AP CASE REPORT: NORMAL
LAB AP GYN ADDITIONAL INFORMATION: NORMAL
LAB AP GYN OTHER FINDINGS: NORMAL
PATH INTERP SPEC-IMP: NORMAL
STAT OF ADQ CVX/VAG CYTO-IMP: NORMAL

## 2020-05-26 RX ORDER — METRONIDAZOLE 500 MG/1
500 TABLET ORAL 2 TIMES DAILY
Qty: 14 TABLET | Refills: 0 | Status: SHIPPED | OUTPATIENT
Start: 2020-05-26 | End: 2020-06-02

## 2020-05-27 ENCOUNTER — OFFICE VISIT (OUTPATIENT)
Dept: GASTROENTEROLOGY | Facility: CLINIC | Age: 40
End: 2020-05-27

## 2020-05-27 VITALS
RESPIRATION RATE: 16 BRPM | WEIGHT: 138 LBS | SYSTOLIC BLOOD PRESSURE: 128 MMHG | BODY MASS INDEX: 24.45 KG/M2 | DIASTOLIC BLOOD PRESSURE: 72 MMHG | HEIGHT: 63 IN | HEART RATE: 72 BPM

## 2020-05-27 DIAGNOSIS — Z78.9 NONSMOKER: ICD-10-CM

## 2020-05-27 DIAGNOSIS — R19.4 CHANGE IN BOWEL HABITS: Primary | ICD-10-CM

## 2020-05-27 DIAGNOSIS — K59.01 SLOW TRANSIT CONSTIPATION: ICD-10-CM

## 2020-05-27 PROCEDURE — 99214 OFFICE O/P EST MOD 30 MIN: CPT | Performed by: CLINICAL NURSE SPECIALIST

## 2020-05-27 RX ORDER — SODIUM, POTASSIUM,MAG SULFATES 17.5-3.13G
2 SOLUTION, RECONSTITUTED, ORAL ORAL ONCE
Qty: 1 BOTTLE | Refills: 0 | Status: SHIPPED | OUTPATIENT
Start: 2020-05-27 | End: 2020-05-27

## 2020-05-27 NOTE — PROGRESS NOTES
Amanda Beltran  1980  Chief Complaint   Patient presents with   • GI Problem     Change in bowel habits     Subjective   HPI  Amanda Beltran is a 39 y.o. female who presents with a complaint of change in bowels to constipation over the past few weeks. She says that she is going but small amounts. She treated with Magnesium Citrate and she had 2 small BMs. She did an enema and had good results. She continued Miralax after enema once per day. Her stools are soft but she feels full. She has had 1 episode of BRBPR of small amount on the tissue 1 month ago before the change of bowels began. She started a yeast supplement 3 months ago but this is the only change.   She has just started Flagyl for a vaginal infection.   No NSAIDS.   No wt loss.   No fever chills or sweats  Some mild nausea noted with the constipation.   Hx of IBS predominant diarrhea in the past as her usual.      Past Medical History:   Diagnosis Date   • Irritable bowel disease    • Lactose intolerance      Past Surgical History:   Procedure Laterality Date   • APPENDECTOMY     • AUGMENTATION MAMMAPLASTY     • BREAST AUGMENTATION     •  SECTION     • COLONOSCOPY  07/10/2012    Normal exam   • HAND SURGERY      CUT HERSELF WITH KNIFE   • TONSILLECTOMY     • VAGINAL BIRTH AFTER  SECTION         Outpatient Medications Marked as Taking for the 20 encounter (Office Visit) with Lachelle Greco APRN   Medication Sig Dispense Refill   • cholecalciferol (VITAMIN D3) 1000 UNITS tablet Take 3,000 Units by mouth Daily.     • clonazePAM (KlonoPIN) 0.25 MG disintegrating tablet Take 0.25 mg by mouth As Needed.     • cyclobenzaprine (FLEXERIL) 10 MG tablet Take 10 mg by mouth As Needed.     • Homeopathic Products (YEAST-FABRIZIO ADV HOMEOPATHIC PO)      • hydrOXYzine (ATARAX) 25 MG tablet Take 25 mg by mouth As Needed.     • metroNIDAZOLE (Flagyl) 500 MG tablet Take 1 tablet by mouth 2 (Two) Times a Day for 7 days. 14  tablet 0   • Multiple Vitamins-Minerals (MULTIVITAMIN WITH MINERALS) tablet tablet Take 1 tablet by mouth Daily.     • norgestimate-ethinyl estradiol (Sprintec 28) 0.25-35 MG-MCG per tablet Take 1 tablet by mouth Daily. 28 tablet 12   • terconazole (Terazol 3) 0.8 % vaginal cream Insert 1 applicator into the vagina Every Night. 20 g 0   • venlafaxine XR (EFFEXOR-XR) 75 MG 24 hr capsule Take 75 mg by mouth Daily.       Allergies   Allergen Reactions   • Sulfa Antibiotics Hives     HIVES       Social History     Socioeconomic History   • Marital status:      Spouse name: Not on file   • Number of children: Not on file   • Years of education: Not on file   • Highest education level: Not on file   Tobacco Use   • Smoking status: Never Smoker   • Smokeless tobacco: Never Used   Substance and Sexual Activity   • Alcohol use: Yes     Comment: occassional   • Drug use: No   • Sexual activity: Yes     Birth control/protection: OCP     Family History   Problem Relation Age of Onset   • Cirrhosis Mother    • No Known Problems Father    • No Known Problems Sister    • No Known Problems Brother    • Prostate cancer Maternal Grandfather    • Breast cancer Maternal Aunt    • Ovarian cancer Neg Hx    • Uterine cancer Neg Hx    • Colon cancer Neg Hx    • Melanoma Neg Hx    • Colon polyps Neg Hx      Health Maintenance   Topic Date Due   • ANNUAL PHYSICAL  08/09/1985   • TDAP/TD VACCINES (1 - Tdap) 08/09/1993   • HEPATITIS C SCREENING  11/02/2016   • INFLUENZA VACCINE  08/01/2020   • PAP SMEAR  05/19/2023     Review of Systems   Constitutional: Negative for activity change, appetite change, chills, diaphoresis, fatigue, fever and unexpected weight change.   HENT: Negative for ear pain, hearing loss, mouth sores, sore throat, trouble swallowing and voice change.    Eyes: Negative.    Respiratory: Negative for cough, choking, shortness of breath and wheezing.    Cardiovascular: Negative for chest pain and palpitations.  "  Gastrointestinal: Positive for constipation. Negative for abdominal pain, blood in stool, diarrhea, nausea and vomiting.        Bloating and diarrhea   Endocrine: Negative for cold intolerance and heat intolerance.   Genitourinary: Negative for decreased urine volume, dysuria, frequency, hematuria and urgency.   Musculoskeletal: Negative for back pain, gait problem and myalgias.   Skin: Negative for color change, pallor and rash.   Allergic/Immunologic: Negative for food allergies and immunocompromised state.   Neurological: Negative for dizziness, tremors, seizures, syncope, weakness, light-headedness, numbness and headaches.   Hematological: Negative for adenopathy. Does not bruise/bleed easily.   Psychiatric/Behavioral: Negative for agitation and confusion. The patient is not nervous/anxious.    All other systems reviewed and are negative.    Objective   Vitals:    05/27/20 0949   BP: 128/72   Pulse: 72   Resp: 16   Weight: 62.6 kg (138 lb)   Height: 158.8 cm (62.5\")     Body mass index is 24.84 kg/m².  Physical Exam   Constitutional: She is oriented to person, place, and time. She appears well-developed and well-nourished.   HENT:   Head: Normocephalic and atraumatic.   Eyes: Pupils are equal, round, and reactive to light.   Neck: Normal range of motion. Neck supple. No tracheal deviation present.   Cardiovascular: Normal rate, regular rhythm and normal heart sounds. Exam reveals no gallop and no friction rub.   No murmur heard.  Pulmonary/Chest: Effort normal and breath sounds normal. No respiratory distress. She has no wheezes. She has no rales. She exhibits no tenderness.   Abdominal: Soft. Bowel sounds are normal. She exhibits no distension. There is no hepatosplenomegaly. There is no tenderness. There is no rigidity, no rebound and no guarding.   Musculoskeletal: Normal range of motion. She exhibits no edema, tenderness or deformity.   Neurological: She is alert and oriented to person, place, and time. " She has normal reflexes.   Skin: Skin is warm and dry. No rash noted. No pallor.   Psychiatric: She has a normal mood and affect. Her behavior is normal. Judgment and thought content normal.     Assessment/Plan   Amanda was seen today for gi problem.    Diagnoses and all orders for this visit:    Change in bowel habits  -     Case Request; Standing  -     Follow Anesthesia Guidelines / Standing Orders; Future  -     Obtain Informed Consent; Future  -     Case Request  -     sodium-potassium-magnesium sulfates (Suprep Bowel Prep Kit) 17.5-3.13-1.6 GM/177ML solution oral solution; Take 2 bottles by mouth 1 (One) Time for 1 dose. As directed by office instructions provided.  -     Celiac Disease Panel    Slow transit constipation    Nonsmoker    increase water  Fiber   Miralax as needed    COLONOSCOPY WITH ANESTHESIA (N/A)  Part of this note may be an electronic transcription/translation of spoken language to printed text using the Dragon Dictation System.  Body mass index is 24.84 kg/m².  No follow-ups on file.    Patient's Body mass index is 24.84 kg/m². BMI is within normal parameters. No follow-up required..      All risks, benefits, alternatives, and indications of colonoscopy and/or Endoscopy procedure have been discussed with the patient. Risks to include perforation of the colon requiring possible surgery or colostomy, risk of bleeding from biopsies or removal of colon tissue, possibility of missing a colon polyp or cancer, or adverse drug reaction.  Benefits to include the diagnosis and management of disease of the colon and rectum. Alternatives to include barium enema, radiographic evaluation, lab testing or no intervention. Pt verbalizes understanding and agrees.     Lachelle Greco, APRN  5/27/2020  10:31      Obesity, Adult  Obesity is the condition of having too much total body fat. Being overweight or obese means that your weight is greater than what is considered healthy for your body size.  Obesity is determined by a measurement called BMI. BMI is an estimate of body fat and is calculated from height and weight. For adults, a BMI of 30 or higher is considered obese.  Obesity can eventually lead to other health concerns and major illnesses, including:  · Stroke.  · Coronary artery disease (CAD).  · Type 2 diabetes.  · Some types of cancer, including cancers of the colon, breast, uterus, and gallbladder.  · Osteoarthritis.  · High blood pressure (hypertension).  · High cholesterol.  · Sleep apnea.  · Gallbladder stones.  · Infertility problems.  What are the causes?  The main cause of obesity is taking in (consuming) more calories than your body uses for energy. Other factors that contribute to this condition may include:  · Being born with genes that make you more likely to become obese.  · Having a medical condition that causes obesity. These conditions include:  ¨ Hypothyroidism.  ¨ Polycystic ovarian syndrome (PCOS).  ¨ Binge-eating disorder.  ¨ Cushing syndrome.  · Taking certain medicines, such as steroids, antidepressants, and seizure medicines.  · Not being physically active (sedentary lifestyle).  · Living where there are limited places to exercise safely or buy healthy foods.  · Not getting enough sleep.  What increases the risk?  The following factors may increase your risk of this condition:  · Having a family history of obesity.  · Being a woman of -American descent.  · Being a man of  descent.  What are the signs or symptoms?  Having excessive body fat is the main symptom of this condition.  How is this diagnosed?  This condition may be diagnosed based on:  · Your symptoms.  · Your medical history.  · A physical exam. Your health care provider may measure:  ¨ Your BMI. If you are an adult with a BMI between 25 and less than 30, you are considered overweight. If you are an adult with a BMI of 30 or higher, you are considered obese.  ¨ The distances around your hips and your  waist (circumferences). These may be compared to each other to help diagnose your condition.  ¨ Your skinfold thickness. Your health care provider may gently pinch a fold of your skin and measure it.  How is this treated?  Treatment for this condition often includes changing your lifestyle. Treatment may include some or all of the following:  · Dietary changes. Work with your health care provider and a dietitian to set a weight-loss goal that is healthy and reasonable for you. Dietary changes may include eating:  ¨ Smaller portions. A portion size is the amount of a particular food that is healthy for you to eat at one time. This varies from person to person.  ¨ Low-calorie or low-fat options.  ¨ More whole grains, fruits, and vegetables.  · Regular physical activity. This may include aerobic activity (cardio) and strength training.  · Medicine to help you lose weight. Your health care provider may prescribe medicine if you are unable to lose 1 pound a week after 6 weeks of eating more healthily and doing more physical activity.  · Surgery. Surgical options may include gastric banding and gastric bypass. Surgery may be done if:  ¨ Other treatments have not helped to improve your condition.  ¨ You have a BMI of 40 or higher.  ¨ You have life-threatening health problems related to obesity.  Follow these instructions at home:     Eating and drinking     · Follow recommendations from your health care provider about what you eat and drink. Your health care provider may advise you to:  ¨ Limit fast foods, sweets, and processed snack foods.  ¨ Choose low-fat options, such as low-fat milk instead of whole milk.  ¨ Eat 5 or more servings of fruits or vegetables every day.  ¨ Eat at home more often. This gives you more control over what you eat.  ¨ Choose healthy foods when you eat out.  ¨ Learn what a healthy portion size is.  ¨ Keep low-fat snacks on hand.  ¨ Avoid sugary drinks, such as soda, fruit juice, iced tea  sweetened with sugar, and flavored milk.  ¨ Eat a healthy breakfast.  · Drink enough water to keep your urine clear or pale yellow.  · Do not go without eating for long periods of time (do not fast) or follow a fad diet. Fasting and fad diets can be unhealthy and even dangerous.  Physical Activity   · Exercise regularly, as told by your health care provider. Ask your health care provider what types of exercise are safe for you and how often you should exercise.  · Warm up and stretch before being active.  · Cool down and stretch after being active.  · Rest between periods of activity.  Lifestyle   · Limit the time that you spend in front of your TV, computer, or video game system.  · Find ways to reward yourself that do not involve food.  · Limit alcohol intake to no more than 1 drink a day for nonpregnant women and 2 drinks a day for men. One drink equals 12 oz of beer, 5 oz of wine, or 1½ oz of hard liquor.  General instructions   · Keep a weight loss journal to keep track of the food you eat and how much you exercise you get.  · Take over-the-counter and prescription medicines only as told by your health care provider.  · Take vitamins and supplements only as told by your health care provider.  · Consider joining a support group. Your health care provider may be able to recommend a support group.  · Keep all follow-up visits as told by your health care provider. This is important.  Contact a health care provider if:  · You are unable to meet your weight loss goal after 6 weeks of dietary and lifestyle changes.  This information is not intended to replace advice given to you by your health care provider. Make sure you discuss any questions you have with your health care provider.  Document Released: 01/25/2006 Document Revised: 05/22/2017 Document Reviewed: 10/05/2016  [employer] Interactive Patient Education © 2017 [employer]      If you smoke or use tobacco, 4 minutes reading provided  Steps to Quit  Smoking  Smoking tobacco can be harmful to your health and can affect almost every organ in your body. Smoking puts you, and those around you, at risk for developing many serious chronic diseases. Quitting smoking is difficult, but it is one of the best things that you can do for your health. It is never too late to quit.  What are the benefits of quitting smoking?  When you quit smoking, you lower your risk of developing serious diseases and conditions, such as:  · Lung cancer or lung disease, such as COPD.  · Heart disease.  · Stroke.  · Heart attack.  · Infertility.  · Osteoporosis and bone fractures.  Additionally, symptoms such as coughing, wheezing, and shortness of breath may get better when you quit. You may also find that you get sick less often because your body is stronger at fighting off colds and infections. If you are pregnant, quitting smoking can help to reduce your chances of having a baby of low birth weight.  How do I get ready to quit?  When you decide to quit smoking, create a plan to make sure that you are successful. Before you quit:  · Pick a date to quit. Set a date within the next two weeks to give you time to prepare.  · Write down the reasons why you are quitting. Keep this list in places where you will see it often, such as on your bathroom mirror or in your car or wallet.  · Identify the people, places, things, and activities that make you want to smoke (triggers) and avoid them. Make sure to take these actions:  ¨ Throw away all cigarettes at home, at work, and in your car.  ¨ Throw away smoking accessories, such as ashtrays and lighters.  ¨ Clean your car and make sure to empty the ashtray.  ¨ Clean your home, including curtains and carpets.  · Tell your family, friends, and coworkers that you are quitting. Support from your loved ones can make quitting easier.  · Talk with your health care provider about your options for quitting smoking.  · Find out what treatment options are  covered by your health insurance.  What strategies can I use to quit smoking?  Talk with your healthcare provider about different strategies to quit smoking. Some strategies include:  · Quitting smoking altogether instead of gradually lessening how much you smoke over a period of time. Research shows that quitting “cold turkey” is more successful than gradually quitting.  · Attending in-person counseling to help you build problem-solving skills. You are more likely to have success in quitting if you attend several counseling sessions. Even short sessions of 10 minutes can be effective.  · Finding resources and support systems that can help you to quit smoking and remain smoke-free after you quit. These resources are most helpful when you use them often. They can include:  ¨ Online chats with a counselor.  ¨ Telephone quitlines.  ¨ Printed self-help materials.  ¨ Support groups or group counseling.  ¨ Text messaging programs.  ¨ Mobile phone applications.  · Taking medicines to help you quit smoking. (If you are pregnant or breastfeeding, talk with your health care provider first.) Some medicines contain nicotine and some do not. Both types of medicines help with cravings, but the medicines that include nicotine help to relieve withdrawal symptoms. Your health care provider may recommend:  ¨ Nicotine patches, gum, or lozenges.  ¨ Nicotine inhalers or sprays.  ¨ Non-nicotine medicine that is taken by mouth.  Talk with your health care provider about combining strategies, such as taking medicines while you are also receiving in-person counseling. Using these two strategies together makes you more likely to succeed in quitting than if you used either strategy on its own.  If you are pregnant or breastfeeding, talk with your health care provider about finding counseling or other support strategies to quit smoking. Do not take medicine to help you quit smoking unless told to do so by your health care provider.  What  things can I do to make it easier to quit?  Quitting smoking might feel overwhelming at first, but there is a lot that you can do to make it easier. Take these important actions:  · Reach out to your family and friends and ask that they support and encourage you during this time. Call telephone quitlines, reach out to support groups, or work with a counselor for support.  · Ask people who smoke to avoid smoking around you.  · Avoid places that trigger you to smoke, such as bars, parties, or smoke-break areas at work.  · Spend time around people who do not smoke.  · Lessen stress in your life, because stress can be a smoking trigger for some people. To lessen stress, try:  ¨ Exercising regularly.  ¨ Deep-breathing exercises.  ¨ Yoga.  ¨ Meditating.  ¨ Performing a body scan. This involves closing your eyes, scanning your body from head to toe, and noticing which parts of your body are particularly tense. Purposefully relax the muscles in those areas.  · Download or purchase mobile phone or tablet apps (applications) that can help you stick to your quit plan by providing reminders, tips, and encouragement. There are many free apps, such as QuitGuide from the CDC (Centers for Disease Control and Prevention). You can find other support for quitting smoking (smoking cessation) through smokefree.gov and other websites.  How will I feel when I quit smoking?  Within the first 24 hours of quitting smoking, you may start to feel some withdrawal symptoms. These symptoms are usually most noticeable 2-3 days after quitting, but they usually do not last beyond 2-3 weeks. Changes or symptoms that you might experience include:  · Mood swings.  · Restlessness, anxiety, or irritation.  · Difficulty concentrating.  · Dizziness.  · Strong cravings for sugary foods in addition to nicotine.  · Mild weight gain.  · Constipation.  · Nausea.  · Coughing or a sore throat.  · Changes in how your medicines work in your body.  · A depressed  mood.  · Difficulty sleeping (insomnia).  After the first 2-3 weeks of quitting, you may start to notice more positive results, such as:  · Improved sense of smell and taste.  · Decreased coughing and sore throat.  · Slower heart rate.  · Lower blood pressure.  · Clearer skin.  · The ability to breathe more easily.  · Fewer sick days.  Quitting smoking is very challenging for most people. Do not get discouraged if you are not successful the first time. Some people need to make many attempts to quit before they achieve long-term success. Do your best to stick to your quit plan, and talk with your health care provider if you have any questions or concerns.  This information is not intended to replace advice given to you by your health care provider. Make sure you discuss any questions you have with your health care provider.  Document Released: 12/12/2002 Document Revised: 08/15/2017 Document Reviewed: 05/03/2016  [employer] Interactive Patient Education © 2017 [employer]

## 2020-05-29 DIAGNOSIS — Z01.419 WELL WOMAN EXAM WITH ROUTINE GYNECOLOGICAL EXAM: Primary | ICD-10-CM

## 2020-06-08 ENCOUNTER — LAB (OUTPATIENT)
Dept: LAB | Facility: HOSPITAL | Age: 40
End: 2020-06-08

## 2020-06-08 ENCOUNTER — TRANSCRIBE ORDERS (OUTPATIENT)
Dept: ADMINISTRATIVE | Facility: HOSPITAL | Age: 40
End: 2020-06-08

## 2020-06-08 DIAGNOSIS — Z01.818 PRE-OP TESTING: Primary | ICD-10-CM

## 2020-06-08 LAB
25(OH)D3 SERPL-MCNC: 62.2 NG/ML (ref 30–100)
ALBUMIN SERPL-MCNC: 4.5 G/DL (ref 3.5–5.2)
ALBUMIN/GLOB SERPL: 1.8 G/DL
ALP SERPL-CCNC: 39 U/L (ref 39–117)
ALT SERPL W P-5'-P-CCNC: 10 U/L (ref 1–33)
ANION GAP SERPL CALCULATED.3IONS-SCNC: 10.4 MMOL/L (ref 5–15)
AST SERPL-CCNC: 26 U/L (ref 1–32)
BASOPHILS # BLD AUTO: 0.06 10*3/MM3 (ref 0–0.2)
BASOPHILS NFR BLD AUTO: 1.2 % (ref 0–1.5)
BILIRUB SERPL-MCNC: 0.5 MG/DL (ref 0.2–1.2)
BUN BLD-MCNC: 9 MG/DL (ref 6–20)
BUN/CREAT SERPL: 13.4 (ref 7–25)
CALCIUM SPEC-SCNC: 9 MG/DL (ref 8.6–10.5)
CHLORIDE SERPL-SCNC: 100 MMOL/L (ref 98–107)
CHOLEST SERPL-MCNC: 190 MG/DL (ref 0–200)
CO2 SERPL-SCNC: 26.6 MMOL/L (ref 22–29)
CREAT BLD-MCNC: 0.67 MG/DL (ref 0.57–1)
DEPRECATED RDW RBC AUTO: 42.1 FL (ref 37–54)
EOSINOPHIL # BLD AUTO: 0.09 10*3/MM3 (ref 0–0.4)
EOSINOPHIL NFR BLD AUTO: 1.8 % (ref 0.3–6.2)
ERYTHROCYTE [DISTWIDTH] IN BLOOD BY AUTOMATED COUNT: 11.9 % (ref 12.3–15.4)
GFR SERPL CREATININE-BSD FRML MDRD: 99 ML/MIN/1.73
GLOBULIN UR ELPH-MCNC: 2.5 GM/DL
GLUCOSE BLD-MCNC: 88 MG/DL (ref 65–99)
HBA1C MFR BLD: 5 % (ref 4.8–5.6)
HCT VFR BLD AUTO: 39 % (ref 34–46.6)
HDLC SERPL-MCNC: 84 MG/DL (ref 40–60)
HGB BLD-MCNC: 13.1 G/DL (ref 12–15.9)
IMM GRANULOCYTES # BLD AUTO: 0 10*3/MM3 (ref 0–0.05)
IMM GRANULOCYTES NFR BLD AUTO: 0 % (ref 0–0.5)
LDLC SERPL CALC-MCNC: 89 MG/DL (ref 0–100)
LDLC/HDLC SERPL: 1.06 {RATIO}
LYMPHOCYTES # BLD AUTO: 2.12 10*3/MM3 (ref 0.7–3.1)
LYMPHOCYTES NFR BLD AUTO: 41.7 % (ref 19.6–45.3)
MCH RBC QN AUTO: 31.6 PG (ref 26.6–33)
MCHC RBC AUTO-ENTMCNC: 33.6 G/DL (ref 31.5–35.7)
MCV RBC AUTO: 94.2 FL (ref 79–97)
MONOCYTES # BLD AUTO: 0.45 10*3/MM3 (ref 0.1–0.9)
MONOCYTES NFR BLD AUTO: 8.9 % (ref 5–12)
NEUTROPHILS # BLD AUTO: 2.36 10*3/MM3 (ref 1.7–7)
NEUTROPHILS NFR BLD AUTO: 46.4 % (ref 42.7–76)
NRBC BLD AUTO-RTO: 0 /100 WBC (ref 0–0.2)
PLATELET # BLD AUTO: 271 10*3/MM3 (ref 140–450)
PMV BLD AUTO: 11.7 FL (ref 6–12)
POTASSIUM BLD-SCNC: 3.3 MMOL/L (ref 3.5–5.2)
PROT SERPL-MCNC: 7 G/DL (ref 6–8.5)
RBC # BLD AUTO: 4.14 10*6/MM3 (ref 3.77–5.28)
SODIUM BLD-SCNC: 137 MMOL/L (ref 136–145)
T4 FREE SERPL-MCNC: 1.11 NG/DL (ref 0.93–1.7)
TRIGL SERPL-MCNC: 83 MG/DL (ref 0–150)
TSH SERPL DL<=0.05 MIU/L-ACNC: 1.16 UIU/ML (ref 0.27–4.2)
VLDLC SERPL-MCNC: 16.6 MG/DL (ref 5–40)
WBC NRBC COR # BLD: 5.08 10*3/MM3 (ref 3.4–10.8)

## 2020-06-08 PROCEDURE — 80053 COMPREHEN METABOLIC PANEL: CPT | Performed by: NURSE PRACTITIONER

## 2020-06-08 PROCEDURE — 82306 VITAMIN D 25 HYDROXY: CPT | Performed by: NURSE PRACTITIONER

## 2020-06-08 PROCEDURE — 84443 ASSAY THYROID STIM HORMONE: CPT | Performed by: NURSE PRACTITIONER

## 2020-06-08 PROCEDURE — 83036 HEMOGLOBIN GLYCOSYLATED A1C: CPT | Performed by: NURSE PRACTITIONER

## 2020-06-08 PROCEDURE — 80061 LIPID PANEL: CPT | Performed by: NURSE PRACTITIONER

## 2020-06-08 PROCEDURE — 86255 FLUORESCENT ANTIBODY SCREEN: CPT | Performed by: CLINICAL NURSE SPECIALIST

## 2020-06-08 PROCEDURE — 85025 COMPLETE CBC W/AUTO DIFF WBC: CPT | Performed by: NURSE PRACTITIONER

## 2020-06-08 PROCEDURE — 84439 ASSAY OF FREE THYROXINE: CPT | Performed by: NURSE PRACTITIONER

## 2020-06-08 PROCEDURE — 84479 ASSAY OF THYROID (T3 OR T4): CPT | Performed by: NURSE PRACTITIONER

## 2020-06-08 PROCEDURE — 82784 ASSAY IGA/IGD/IGG/IGM EACH: CPT | Performed by: CLINICAL NURSE SPECIALIST

## 2020-06-08 PROCEDURE — 36415 COLL VENOUS BLD VENIPUNCTURE: CPT | Performed by: CLINICAL NURSE SPECIALIST

## 2020-06-08 PROCEDURE — 83516 IMMUNOASSAY NONANTIBODY: CPT | Performed by: CLINICAL NURSE SPECIALIST

## 2020-06-09 LAB
ENDOMYSIUM IGA SER QL: NEGATIVE
IGA SERPL-MCNC: 108 MG/DL (ref 87–352)
T3RU NFR SERPL: 23 % (ref 24–39)
TTG IGA SER-ACNC: <2 U/ML (ref 0–3)

## 2020-06-12 ENCOUNTER — LAB (OUTPATIENT)
Dept: LAB | Facility: HOSPITAL | Age: 40
End: 2020-06-12

## 2020-06-12 PROCEDURE — U0003 INFECTIOUS AGENT DETECTION BY NUCLEIC ACID (DNA OR RNA); SEVERE ACUTE RESPIRATORY SYNDROME CORONAVIRUS 2 (SARS-COV-2) (CORONAVIRUS DISEASE [COVID-19]), AMPLIFIED PROBE TECHNIQUE, MAKING USE OF HIGH THROUGHPUT TECHNOLOGIES AS DESCRIBED BY CMS-2020-01-R: HCPCS | Performed by: INTERNAL MEDICINE

## 2020-06-13 LAB
COVID LABCORP PRIORITY: NORMAL
SARS-COV-2 RNA RESP QL NAA+PROBE: NOT DETECTED

## 2020-06-15 ENCOUNTER — ANESTHESIA (OUTPATIENT)
Dept: GASTROENTEROLOGY | Facility: HOSPITAL | Age: 40
End: 2020-06-15

## 2020-06-15 ENCOUNTER — HOSPITAL ENCOUNTER (OUTPATIENT)
Facility: HOSPITAL | Age: 40
Setting detail: HOSPITAL OUTPATIENT SURGERY
Discharge: HOME | End: 2020-06-15
Attending: INTERNAL MEDICINE | Admitting: INTERNAL MEDICINE

## 2020-06-15 ENCOUNTER — ANESTHESIA EVENT (OUTPATIENT)
Dept: GASTROENTEROLOGY | Facility: HOSPITAL | Age: 40
End: 2020-06-15

## 2020-06-15 VITALS
SYSTOLIC BLOOD PRESSURE: 104 MMHG | WEIGHT: 137 LBS | DIASTOLIC BLOOD PRESSURE: 79 MMHG | HEART RATE: 85 BPM | RESPIRATION RATE: 12 BRPM | TEMPERATURE: 98.6 F | HEIGHT: 62 IN | OXYGEN SATURATION: 99 % | BODY MASS INDEX: 25.21 KG/M2

## 2020-06-15 DIAGNOSIS — R19.4 CHANGE IN BOWEL HABITS: ICD-10-CM

## 2020-06-15 LAB — B-HCG UR QL: NEGATIVE

## 2020-06-15 PROCEDURE — 25010000002 PROPOFOL 10 MG/ML EMULSION: Performed by: NURSE ANESTHETIST, CERTIFIED REGISTERED

## 2020-06-15 PROCEDURE — 45378 DIAGNOSTIC COLONOSCOPY: CPT | Performed by: INTERNAL MEDICINE

## 2020-06-15 PROCEDURE — 81025 URINE PREGNANCY TEST: CPT | Performed by: NURSE ANESTHETIST, CERTIFIED REGISTERED

## 2020-06-15 RX ORDER — SODIUM CHLORIDE 9 MG/ML
500 INJECTION, SOLUTION INTRAVENOUS CONTINUOUS PRN
Status: DISCONTINUED | OUTPATIENT
Start: 2020-06-15 | End: 2020-06-15 | Stop reason: HOSPADM

## 2020-06-15 RX ORDER — PROPOFOL 10 MG/ML
VIAL (ML) INTRAVENOUS AS NEEDED
Status: DISCONTINUED | OUTPATIENT
Start: 2020-06-15 | End: 2020-06-15 | Stop reason: SURG

## 2020-06-15 RX ORDER — LIDOCAINE HYDROCHLORIDE 10 MG/ML
0.5 INJECTION, SOLUTION EPIDURAL; INFILTRATION; INTRACAUDAL; PERINEURAL ONCE AS NEEDED
Status: DISCONTINUED | OUTPATIENT
Start: 2020-06-15 | End: 2020-06-15 | Stop reason: HOSPADM

## 2020-06-15 RX ORDER — SODIUM CHLORIDE 0.9 % (FLUSH) 0.9 %
10 SYRINGE (ML) INJECTION AS NEEDED
Status: DISCONTINUED | OUTPATIENT
Start: 2020-06-15 | End: 2020-06-15 | Stop reason: HOSPADM

## 2020-06-15 RX ADMIN — PROPOFOL 380 MG: 10 INJECTION, EMULSION INTRAVENOUS at 07:57

## 2020-06-15 RX ADMIN — LIDOCAINE HYDROCHLORIDE 50 MG: 20 INJECTION, SOLUTION INTRAVENOUS at 07:57

## 2020-06-15 RX ADMIN — SODIUM CHLORIDE 500 ML: 9 INJECTION, SOLUTION INTRAVENOUS at 07:23

## 2020-06-15 NOTE — ANESTHESIA POSTPROCEDURE EVALUATION
"Patient: Amanda Beltran    Procedure Summary     Date:  06/15/20 Room / Location:   [facility] /  [facility]    Anesthesia Start:  0757 Anesthesia Stop:  0822    Procedure:  COLONOSCOPY WITH ANESTHESIA (N/A ) Diagnosis:       Change in bowel habits      (Change in bowel habits [R19.4])    Surgeon:  Amadou Harden MD Provider:  Sulma Celeste CRNA    Anesthesia Type:  MAC ASA Status:  1          Anesthesia Type: MAC    Vitals  Vitals Value Taken Time   /79 6/15/2020  8:30 AM   Temp     Pulse 88 6/15/2020  8:32 AM   Resp 12 6/15/2020  8:30 AM   SpO2 100 % 6/15/2020  8:32 AM   Vitals shown include unvalidated device data.        Post Anesthesia Care and Evaluation    Patient location during evaluation: PACU  Patient participation: complete - patient participated  Level of consciousness: awake and alert  Pain management: adequate  Airway patency: patent  Anesthetic complications: No anesthetic complications    Cardiovascular status: acceptable  Respiratory status: acceptable  Hydration status: acceptable    Comments: Blood pressure 104/79, pulse 85, temperature 98.6 °F (37 °C), temperature source Temporal, resp. rate 12, height 157.5 cm (62\"), weight 62.1 kg (137 lb), SpO2 99 %, not currently breastfeeding.    Pt discharged from PACU based on louis score >8      "

## 2020-06-15 NOTE — ANESTHESIA PREPROCEDURE EVALUATION
Anesthesia Evaluation     Patient summary reviewed   no history of anesthetic complications:  NPO Solid Status: > 8 hours  NPO Liquid Status: > 4 hours           Airway   Mallampati: I  TM distance: >3 FB  Neck ROM: full  No difficulty expected  Dental          Pulmonary - negative pulmonary ROS   Cardiovascular - negative cardio ROS  Exercise tolerance: good (4-7 METS)        Neuro/Psych- negative ROS  GI/Hepatic/Renal/Endo - negative ROS     Musculoskeletal (-) negative ROS    Abdominal    Substance History      OB/GYN          Other - negative ROS                       Anesthesia Plan    ASA 1     MAC     intravenous induction     Anesthetic plan, all risks, benefits, and alternatives have been provided, discussed and informed consent has been obtained with: patient.

## 2020-07-06 PROCEDURE — U0003 INFECTIOUS AGENT DETECTION BY NUCLEIC ACID (DNA OR RNA); SEVERE ACUTE RESPIRATORY SYNDROME CORONAVIRUS 2 (SARS-COV-2) (CORONAVIRUS DISEASE [COVID-19]), AMPLIFIED PROBE TECHNIQUE, MAKING USE OF HIGH THROUGHPUT TECHNOLOGIES AS DESCRIBED BY CMS-2020-01-R: HCPCS | Performed by: PHYSICIAN ASSISTANT

## 2020-07-11 ENCOUNTER — NURSE TRIAGE (OUTPATIENT)
Dept: CALL CENTER | Facility: HOSPITAL | Age: 40
End: 2020-07-11

## 2020-07-11 NOTE — TELEPHONE ENCOUNTER
"MERNA Prajapati calling to report negative COVID test, patient informed and voiced underrstanding    Reason for Disposition  • Lab or radiology calling with CRITICAL test results    Additional Information  • Negative: Lab calling with strep throat test results and triager can call in prescription  • Negative: Lab calling with urinalysis test results and triager can call in prescription  • Negative: Medication questions  • Negative: ED call to PCP  • Negative: Physician call to PCP  • Negative: Call about patient who is currently hospitalized    Answer Assessment - Initial Assessment Questions  1. REASON FOR CALL or QUESTION: \"What is your reason for calling today?\" or \"How can I best  help you?\" or \"What question do you have that I can help answer?\"      Patient had COVID TEST   2. CALLER: Document the source of call. (e.g., laboratory, patient).      MERNA Prajapati    Protocols used: PCP CALL - NO TRIAGE-ADULT-      "

## 2020-09-22 RX ORDER — METHYLPREDNISOLONE 4 MG/1
TABLET ORAL
Qty: 1 EACH | Refills: 0 | Status: SHIPPED | OUTPATIENT
Start: 2020-09-22 | End: 2020-12-31

## 2020-09-22 RX ORDER — AZITHROMYCIN 250 MG/1
250 TABLET, FILM COATED ORAL DAILY
Qty: 6 TABLET | Refills: 0 | Status: SHIPPED | OUTPATIENT
Start: 2020-09-22 | End: 2020-09-28

## 2020-11-13 PROCEDURE — 87635 SARS-COV-2 COVID-19 AMP PRB: CPT | Performed by: NURSE PRACTITIONER

## 2020-12-31 PROCEDURE — 87635 SARS-COV-2 COVID-19 AMP PRB: CPT | Performed by: NURSE PRACTITIONER

## 2021-01-02 ENCOUNTER — IMMUNIZATION (OUTPATIENT)
Dept: VACCINE CLINIC | Facility: HOSPITAL | Age: 41
End: 2021-01-02

## 2021-01-02 PROCEDURE — 91301 HC SARSCO02 VAC 100MCG/0.5ML IM: CPT | Performed by: OBSTETRICS & GYNECOLOGY

## 2021-01-02 PROCEDURE — 0011A: CPT | Performed by: OBSTETRICS & GYNECOLOGY

## 2021-01-30 ENCOUNTER — IMMUNIZATION (OUTPATIENT)
Dept: VACCINE CLINIC | Facility: HOSPITAL | Age: 41
End: 2021-01-30

## 2021-01-30 PROCEDURE — 0012A: CPT | Performed by: OBSTETRICS & GYNECOLOGY

## 2021-01-30 PROCEDURE — 91301 HC SARSCO02 VAC 100MCG/0.5ML IM: CPT | Performed by: OBSTETRICS & GYNECOLOGY

## 2021-05-25 DIAGNOSIS — Z30.41 ENCOUNTER FOR SURVEILLANCE OF CONTRACEPTIVE PILLS: ICD-10-CM

## 2021-05-25 NOTE — TELEPHONE ENCOUNTER
PC due to provider being out today she had to R/S appt for yearly to 6/15. Needs 1 pack of Sprintec. I've pend med.

## 2021-05-26 RX ORDER — NORGESTIMATE AND ETHINYL ESTRADIOL 0.25-0.035
1 KIT ORAL DAILY
Qty: 28 TABLET | Refills: 1 | Status: SHIPPED | OUTPATIENT
Start: 2021-05-26 | End: 2021-06-04

## 2021-06-04 DIAGNOSIS — Z30.41 ENCOUNTER FOR SURVEILLANCE OF CONTRACEPTIVE PILLS: ICD-10-CM

## 2021-06-15 ENCOUNTER — OFFICE VISIT (OUTPATIENT)
Dept: OBSTETRICS AND GYNECOLOGY | Facility: CLINIC | Age: 41
End: 2021-06-15

## 2021-06-15 VITALS
WEIGHT: 131 LBS | BODY MASS INDEX: 23.21 KG/M2 | HEIGHT: 63 IN | SYSTOLIC BLOOD PRESSURE: 116 MMHG | DIASTOLIC BLOOD PRESSURE: 70 MMHG

## 2021-06-15 DIAGNOSIS — Z01.419 WELL WOMAN EXAM WITH ROUTINE GYNECOLOGICAL EXAM: Primary | ICD-10-CM

## 2021-06-15 DIAGNOSIS — Z30.41 ENCOUNTER FOR SURVEILLANCE OF CONTRACEPTIVE PILLS: ICD-10-CM

## 2021-06-15 DIAGNOSIS — Z12.4 CERVICAL CANCER SCREENING: ICD-10-CM

## 2021-06-15 DIAGNOSIS — N94.3 PMS (PREMENSTRUAL SYNDROME): ICD-10-CM

## 2021-06-15 PROCEDURE — 87624 HPV HI-RISK TYP POOLED RSLT: CPT | Performed by: NURSE PRACTITIONER

## 2021-06-15 PROCEDURE — 99213 OFFICE O/P EST LOW 20 MIN: CPT | Performed by: NURSE PRACTITIONER

## 2021-06-15 PROCEDURE — 99395 PREV VISIT EST AGE 18-39: CPT | Performed by: NURSE PRACTITIONER

## 2021-06-15 PROCEDURE — G0123 SCREEN CERV/VAG THIN LAYER: HCPCS | Performed by: NURSE PRACTITIONER

## 2021-06-15 RX ORDER — LORATADINE 10 MG/1
CAPSULE, LIQUID FILLED ORAL
COMMUNITY

## 2021-06-15 RX ORDER — FLUTICASONE PROPIONATE 50 MCG
SPRAY, SUSPENSION (ML) NASAL
COMMUNITY

## 2021-06-15 RX ORDER — VORTIOXETINE 20 MG/1
TABLET, FILM COATED ORAL
COMMUNITY
Start: 2021-05-19 | End: 2023-03-29

## 2021-06-15 RX ORDER — NORGESTIMATE AND ETHINYL ESTRADIOL 0.25-0.035
1 KIT ORAL DAILY
Qty: 84 TABLET | Refills: 4 | Status: SHIPPED | OUTPATIENT
Start: 2021-06-15 | End: 2021-11-18

## 2021-06-15 RX ORDER — DIAZEPAM 2 MG/1
TABLET ORAL
COMMUNITY
End: 2022-08-10

## 2021-06-15 NOTE — PROGRESS NOTES
Yvette Beltran is a 40 y.o. female  YOB: 1980        Chief Complaint   Patient presents with   • Gynecologic Exam     Patient here for yearly exam. Last exam was done 5/19/20 and was normal. Patient is on Sprintec OCP and doing well with it. Patient needs refills sent to her pharmacy. Patient voices no complaints or concerns today.        Gynecologic Exam  The patient's pertinent negatives include no pelvic pain. Pertinent negatives include no abdominal pain, back pain, constipation, diarrhea, dysuria, fever, frequency, hematuria, nausea, rash, sore throat, urgency or vomiting.       The following portions of the patient's history were reviewed and updated as appropriate: allergies, current medications, past family history, past medical history, past social history, past surgical history and problem list.    Allergies   Allergen Reactions   • Sulfa Antibiotics Hives     HIVES         Past Medical History:   Diagnosis Date   • Anxiety and depression    • Irritable bowel disease    • Lactose intolerance    • Seasonal allergies        Family History   Problem Relation Age of Onset   • Cirrhosis Mother    • No Known Problems Father    • No Known Problems Sister    • No Known Problems Brother    • Prostate cancer Maternal Grandfather    • Breast cancer Maternal Aunt    • Ovarian cancer Neg Hx    • Uterine cancer Neg Hx    • Colon cancer Neg Hx    • Melanoma Neg Hx    • Colon polyps Neg Hx        Social History     Socioeconomic History   • Marital status:      Spouse name: Not on file   • Number of children: Not on file   • Years of education: Not on file   • Highest education level: Not on file   Tobacco Use   • Smoking status: Never Smoker   • Smokeless tobacco: Never Used   Vaping Use   • Vaping Use: Never used   Substance and Sexual Activity   • Alcohol use: Yes     Comment: occassional   • Drug use: No   • Sexual activity: Yes     Birth control/protection: OCP         Current  Outpatient Medications:   •  cholecalciferol (VITAMIN D3) 1000 UNITS tablet, Take 3,000 Units by mouth Daily., Disp: , Rfl:   •  clonazePAM (KlonoPIN) 0.25 MG disintegrating tablet, Take 0.25 mg by mouth As Needed., Disp: , Rfl:   •  cyclobenzaprine (FLEXERIL) 10 MG tablet, Take 10 mg by mouth As Needed., Disp: , Rfl:   •  diazePAM (VALIUM) 2 MG tablet, diazepam 2 mg tablet, Disp: , Rfl:   •  fluticasone (Flonase) 50 MCG/ACT nasal spray, Flonase Allergy Relief 50 mcg/actuation nasal spray,suspension  Inhale 2 sprays every day by intranasal route as needed., Disp: , Rfl:   •  hydrOXYzine (ATARAX) 25 MG tablet, Take 25 mg by mouth As Needed., Disp: , Rfl:   •  Loratadine 10 MG capsule, loratadine 10 mg capsule  Take 1 capsule every day by oral route., Disp: , Rfl:   •  Multiple Vitamins-Minerals (MULTIVITAMIN WITH MINERALS) tablet tablet, Take 1 tablet by mouth Daily., Disp: , Rfl:   •  Trintellix 20 MG tablet, , Disp: , Rfl:   •  norgestimate-ethinyl estradiol (Sprintec 28) 0.25-35 MG-MCG per tablet, Take 1 tablet by mouth Daily., Disp: 84 tablet, Rfl: 4    No LMP recorded. (Menstrual status: Oral contraceptives).    Sexual History:           Could not be calculated    Past Surgical History:   Procedure Laterality Date   • APPENDECTOMY     • AUGMENTATION MAMMAPLASTY     • BREAST AUGMENTATION     •  SECTION     • COLONOSCOPY  07/10/2012    Normal exam   • COLONOSCOPY N/A 6/15/2020    Procedure: COLONOSCOPY WITH ANESTHESIA;  Surgeon: Amadou Harden MD;  Location: [facility] ENDOSCOPY;  Service: Gastroenterology;  Laterality: N/A;  pre change in bowel habits  post normal  dr debbie zee   • HAND SURGERY      CUT HERSELF WITH KNIFE   • TONSILLECTOMY     • VAGINAL BIRTH AFTER  SECTION         Review of Systems   Constitutional: Negative for activity change, appetite change, fatigue, fever, unexpected weight gain and unexpected weight loss.   HENT: Negative for congestion, ear pain, hearing loss,  nosebleeds, rhinorrhea, sore throat, tinnitus and trouble swallowing.    Eyes: Negative for blurred vision, pain, discharge, itching and visual disturbance.   Respiratory: Negative for apnea, chest tightness, shortness of breath and wheezing.    Cardiovascular: Negative for chest pain and leg swelling.   Gastrointestinal: Negative for abdominal pain, blood in stool, constipation, diarrhea, nausea, vomiting and GERD.   Endocrine: Negative for heat intolerance, polydipsia and polyuria.   Genitourinary: Negative for breast lump, decreased libido, difficulty urinating, dyspareunia, dysuria, frequency, genital sores, hematuria, menstrual problem, pelvic pain, urgency, urinary incontinence and vaginal pain.   Musculoskeletal: Negative for arthralgias, back pain, joint swelling and myalgias.   Skin: Negative for color change, rash and skin lesions.   Allergic/Immunologic: Negative for environmental allergies, food allergies and immunocompromised state.   Neurological: Negative for dizziness, tremors, seizures, syncope, facial asymmetry, numbness and headache.   Hematological: Negative for adenopathy. Does not bruise/bleed easily.   Psychiatric/Behavioral: Negative for agitation, hallucinations, sleep disturbance, suicidal ideas and depressed mood. The patient is not nervous/anxious.        Objective   Physical Exam  Vitals and nursing note reviewed. Exam conducted with a chaperone present.   Constitutional:       General: She is not in acute distress.     Appearance: She is well-developed. She is not diaphoretic.   HENT:      Head: Normocephalic.      Right Ear: External ear normal.      Left Ear: External ear normal.      Nose: Nose normal.   Eyes:      General: No scleral icterus.        Right eye: No discharge.         Left eye: No discharge.      Conjunctiva/sclera: Conjunctivae normal.      Pupils: Pupils are equal, round, and reactive to light.   Neck:      Thyroid: No thyromegaly.      Vascular: No carotid bruit.       Trachea: No tracheal deviation.   Cardiovascular:      Rate and Rhythm: Normal rate and regular rhythm.      Heart sounds: Normal heart sounds. No murmur heard.     Pulmonary:      Effort: Pulmonary effort is normal. No respiratory distress.      Breath sounds: Normal breath sounds. No wheezing.   Chest:      Breasts: Breasts are symmetrical.         Right: Normal. No swelling, bleeding, inverted nipple, mass, nipple discharge, skin change or tenderness.         Left: Normal. No swelling, bleeding, inverted nipple, mass, nipple discharge, skin change or tenderness.   Abdominal:      General: There is no distension.      Palpations: Abdomen is soft. There is no mass.      Tenderness: There is no abdominal tenderness. There is no right CVA tenderness, left CVA tenderness or guarding.      Hernia: No hernia is present. There is no hernia in the left inguinal area or right inguinal area.   Genitourinary:     General: Normal vulva.      Exam position: Lithotomy position.      Labia:         Right: No rash, tenderness, lesion or injury.         Left: No rash, tenderness, lesion or injury.       Vagina: Normal. No signs of injury and foreign body. No vaginal discharge, erythema, tenderness or bleeding.      Cervix: Normal.      Uterus: Normal. Not enlarged, not fixed and not tender.       Adnexa: Right adnexa normal and left adnexa normal.        Right: No mass, tenderness or fullness.          Left: No mass, tenderness or fullness.        Rectum: Normal. No mass.      Comments:   BSU normal  Urethral meatus  Normal  Perineum  Normal  Musculoskeletal:         General: No tenderness. Normal range of motion.      Cervical back: Normal range of motion and neck supple.   Lymphadenopathy:      Head:      Right side of head: No submental, submandibular, tonsillar, preauricular, posterior auricular or occipital adenopathy.      Left side of head: No submental, submandibular, tonsillar, preauricular, posterior auricular or  "occipital adenopathy.      Cervical: No cervical adenopathy.      Right cervical: No superficial, deep or posterior cervical adenopathy.     Left cervical: No superficial, deep or posterior cervical adenopathy.      Upper Body:      Right upper body: No supraclavicular, axillary or pectoral adenopathy.      Left upper body: No supraclavicular, axillary or pectoral adenopathy.      Lower Body: No right inguinal adenopathy. No left inguinal adenopathy.   Skin:     General: Skin is warm and dry.      Findings: No bruising, erythema or rash.   Neurological:      Mental Status: She is alert and oriented to person, place, and time.      Coordination: Coordination normal.   Psychiatric:         Mood and Affect: Mood normal.         Behavior: Behavior normal.         Thought Content: Thought content normal.         Judgment: Judgment normal.           Vitals:    06/15/21 1128   BP: 116/70   Weight: 59.4 kg (131 lb)   Height: 160 cm (63\")       Diagnoses and all orders for this visit:    1. Well woman exam with routine gynecological exam (Primary)  Comments:  Normal well woman exam.  ThinPrep Pap smear done.  Has already done baseline mammogram and it was normal.  Orders:  -     Liquid-based Pap Smear, Screening    2. Cervical cancer screening  Comments:  ThinPrep Pap smear done.  Orders:  -     Liquid-based Pap Smear, Screening    3. Encounter for surveillance of contraceptive pills  Comments:  Doing well on Sprintec and wants to remain on it.  Refill sent to pharmacy.  Orders:  -     norgestimate-ethinyl estradiol (Sprintec 28) 0.25-35 MG-MCG per tablet; Take 1 tablet by mouth Daily.  Dispense: 84 tablet; Refill: 4    4. PMS (premenstrual syndrome)  Comments:  Patient reports PMS and anxiety just before and during period.  Discussed treatment options and risk versus benefits.  Patient is going to continue with cycle her Sprintec.  Thoroughly instructed in use.  RTO in 3 months for follow-up or sooner as " needed.        Normal GYN exam. Will have lab work here. Encouraged SBE.  Pt is aware how to do self breast exam and the importance of same. Discussed weight management and importance of maintaining a healthy weight. Discussed Vitamin D intake and the importance of adequate vitamin D for both bone health and a healthy immune system.  Discussed daily exercise and the importance of same in regards to a healthy heart as well as helping to maintain her weight and improving her mental health.  Body mass index is 23.21 kg/m². Colonoscopy is not age appropriate.  Mammogram will be scheduled at [facility]. Pap smear is done per ASCCP guidelines.    Patient's Body mass index is 23.21 kg/m². indicating that she is within normal range (BMI 18.5-24.9). No BMI management plan needed..             Non-Smoker    MyChart Instructions Given

## 2021-06-17 LAB
GEN CATEG CVX/VAG CYTO-IMP: NORMAL
HPV I/H RISK 4 DNA CVX QL PROBE+SIG AMP: NOT DETECTED
LAB AP CASE REPORT: NORMAL
LAB AP GYN ADDITIONAL INFORMATION: NORMAL
LAB AP GYN OTHER FINDINGS: NORMAL
PATH INTERP SPEC-IMP: NORMAL
STAT OF ADQ CVX/VAG CYTO-IMP: NORMAL

## 2021-07-20 ENCOUNTER — TRANSCRIBE ORDERS (OUTPATIENT)
Dept: ADMINISTRATIVE | Facility: HOSPITAL | Age: 41
End: 2021-07-20

## 2021-07-20 ENCOUNTER — LAB (OUTPATIENT)
Dept: LAB | Facility: HOSPITAL | Age: 41
End: 2021-07-20

## 2021-07-20 DIAGNOSIS — J06.9 ACUTE UPPER RESPIRATORY INFECTION: Primary | ICD-10-CM

## 2021-07-20 LAB

## 2021-07-20 PROCEDURE — C9803 HOPD COVID-19 SPEC COLLECT: HCPCS | Performed by: INTERNAL MEDICINE

## 2021-07-20 PROCEDURE — 0202U NFCT DS 22 TRGT SARS-COV-2: CPT | Performed by: INTERNAL MEDICINE

## 2021-10-26 ENCOUNTER — TELEPHONE (OUTPATIENT)
Dept: OBSTETRICS AND GYNECOLOGY | Facility: CLINIC | Age: 41
End: 2021-10-26

## 2021-10-26 ENCOUNTER — TELEMEDICINE (OUTPATIENT)
Dept: OBSTETRICS AND GYNECOLOGY | Facility: CLINIC | Age: 41
End: 2021-10-26

## 2021-10-26 DIAGNOSIS — N93.8 DUB (DYSFUNCTIONAL UTERINE BLEEDING): Primary | ICD-10-CM

## 2021-10-26 PROCEDURE — 99442 PR PHYS/QHP TELEPHONE EVALUATION 11-20 MIN: CPT | Performed by: NURSE PRACTITIONER

## 2021-10-26 NOTE — PROGRESS NOTES
Patient being seen via telephone visit per patient consent after connection problems with video visit on patient behalf.  Time of telephone/video visit was 15 minutes.  Patient wants to discuss the fact that she is having frequent spotting and bleeding while on Sprintec and wants to talk about other options.  Discussed options and risk versus benefits.  Sample and Rx for Lo Loestrin sent to pharmacy and patient instructed in use.  RTO in 3 months for follow-up or sooner as needed.

## 2021-10-26 NOTE — TELEPHONE ENCOUNTER
Pt had video visit this am with Kim JAIMES   Pt requesting sample of BC.  Lo Loestrin box given  Lot [id number], exp. Oct 2022.

## 2021-11-18 DIAGNOSIS — Z30.41 ENCOUNTER FOR SURVEILLANCE OF CONTRACEPTIVE PILLS: Primary | ICD-10-CM

## 2021-11-18 RX ORDER — NORETHINDRONE ACETATE AND ETHINYL ESTRADIOL, ETHINYL ESTRADIOL AND FERROUS FUMARATE 1MG-10(24)
1 KIT ORAL DAILY
Qty: 28 TABLET | Refills: 1 | COMMUNITY
Start: 2021-11-18 | End: 2022-01-14 | Stop reason: SDUPTHER

## 2021-12-03 ENCOUNTER — IMMUNIZATION (OUTPATIENT)
Dept: VACCINE CLINIC | Facility: HOSPITAL | Age: 41
End: 2021-12-03

## 2021-12-03 DIAGNOSIS — Z23 NEED FOR VACCINATION: Primary | ICD-10-CM

## 2021-12-03 PROCEDURE — 91306 HC SARSCOV2 VAC 50MCG/0.25ML IM: CPT | Performed by: OBSTETRICS & GYNECOLOGY

## 2021-12-03 PROCEDURE — 0064A HC ADM SARSCOV2 50MCG/0.25ML BOOSTER: CPT | Performed by: OBSTETRICS & GYNECOLOGY

## 2022-01-14 DIAGNOSIS — Z30.41 ENCOUNTER FOR SURVEILLANCE OF CONTRACEPTIVE PILLS: ICD-10-CM

## 2022-01-14 RX ORDER — NORETHINDRONE ACETATE AND ETHINYL ESTRADIOL, ETHINYL ESTRADIOL AND FERROUS FUMARATE 1MG-10(24)
1 KIT ORAL DAILY
Qty: 28 TABLET | Refills: 0 | Status: SHIPPED | OUTPATIENT
Start: 2022-01-14 | End: 2022-01-26 | Stop reason: SDUPTHER

## 2022-01-26 ENCOUNTER — OFFICE VISIT (OUTPATIENT)
Dept: OBSTETRICS AND GYNECOLOGY | Facility: CLINIC | Age: 42
End: 2022-01-26

## 2022-01-26 VITALS
BODY MASS INDEX: 25.16 KG/M2 | SYSTOLIC BLOOD PRESSURE: 122 MMHG | HEIGHT: 63 IN | WEIGHT: 142 LBS | DIASTOLIC BLOOD PRESSURE: 78 MMHG

## 2022-01-26 DIAGNOSIS — N93.8 DYSFUNCTIONAL UTERINE BLEEDING: ICD-10-CM

## 2022-01-26 PROCEDURE — 99213 OFFICE O/P EST LOW 20 MIN: CPT | Performed by: NURSE PRACTITIONER

## 2022-01-26 RX ORDER — NORETHINDRONE ACETATE AND ETHINYL ESTRADIOL, ETHINYL ESTRADIOL AND FERROUS FUMARATE 1MG-10(24)
1 KIT ORAL DAILY
Qty: 84 TABLET | Refills: 2 | Status: SHIPPED | OUTPATIENT
Start: 2022-01-26 | End: 2022-08-10 | Stop reason: SDUPTHER

## 2022-02-14 NOTE — PROGRESS NOTES
Subjective   Amanda Beltran is a 41 y.o. female  YOB: 1980      Chief Complaint   Patient presents with   • Med Refill     Patient is here to discuss the norethin-etg estrad she has been on for about three months. Patient states periods are very irregular.        Patient here for follow-up after starting low Loestrin 3 months ago related to irregular menses      The following portions of the patient's history were reviewed and updated as appropriate: allergies, current medications, past family history, past medical history, past social history, past surgical history, and problem list.    Allergies   Allergen Reactions   • Sulfa Antibiotics Hives     HIVES         Past Medical History:   Diagnosis Date   • Anxiety and depression    • Irritable bowel disease    • Lactose intolerance    • Seasonal allergies        Family History   Problem Relation Age of Onset   • Cirrhosis Mother    • No Known Problems Father    • No Known Problems Sister    • No Known Problems Brother    • Prostate cancer Maternal Grandfather    • Breast cancer Maternal Aunt    • Ovarian cancer Neg Hx    • Uterine cancer Neg Hx    • Colon cancer Neg Hx    • Melanoma Neg Hx    • Colon polyps Neg Hx        Social History     Socioeconomic History   • Marital status:    Tobacco Use   • Smoking status: Never Smoker   • Smokeless tobacco: Never Used   Vaping Use   • Vaping Use: Never used   Substance and Sexual Activity   • Alcohol use: Yes     Comment: occassional   • Drug use: No   • Sexual activity: Yes     Birth control/protection: OCP         Current Outpatient Medications:   •  cholecalciferol (VITAMIN D3) 1000 UNITS tablet, Take 3,000 Units by mouth Daily., Disp: , Rfl:   •  clonazePAM (KlonoPIN) 0.25 MG disintegrating tablet, Take 0.25 mg by mouth As Needed., Disp: , Rfl:   •  cyclobenzaprine (FLEXERIL) 10 MG tablet, Take 10 mg by mouth As Needed., Disp: , Rfl:   •  diazePAM (VALIUM) 2 MG tablet, diazepam 2 mg tablet,  Disp: , Rfl:   •  fluticasone (Flonase) 50 MCG/ACT nasal spray, Flonase Allergy Relief 50 mcg/actuation nasal spray,suspension  Inhale 2 sprays every day by intranasal route as needed., Disp: , Rfl:   •  Loratadine 10 MG capsule, loratadine 10 mg capsule  Take 1 capsule every day by oral route., Disp: , Rfl:   •  Multiple Vitamins-Minerals (MULTIVITAMIN WITH MINERALS) tablet tablet, Take 1 tablet by mouth Daily., Disp: , Rfl:   •  Norethin-Eth Estrad-Fe Biphas (Lo Loestrin Fe) 1 MG-10 MCG / 10 MCG tablet, Take 1 tablet by mouth Daily for 28 days., Disp: 84 tablet, Rfl: 2  •  Trintellix 20 MG tablet, , Disp: , Rfl:   •  hydrOXYzine (ATARAX) 25 MG tablet, Take 25 mg by mouth As Needed., Disp: , Rfl:     Patient's last menstrual period was 2022 (approximate).    Sexual History:         Could not be calculated    Past Surgical History:   Procedure Laterality Date   • APPENDECTOMY     • AUGMENTATION MAMMAPLASTY     • BREAST AUGMENTATION     •  SECTION     • COLONOSCOPY  07/10/2012    Normal exam   • COLONOSCOPY N/A 6/15/2020    Procedure: COLONOSCOPY WITH ANESTHESIA;  Surgeon: Amadou Harden MD;  Location: [facility] ENDOSCOPY;  Service: Gastroenterology;  Laterality: N/A;  pre change in bowel habits  post normal  dr debbie zee   • HAND SURGERY      CUT HERSELF WITH KNIFE   • TONSILLECTOMY     • VAGINAL BIRTH AFTER  SECTION         Review of Systems   Constitutional: Negative for activity change, appetite change, chills, diaphoresis, fatigue, fever and unexpected weight change.   HENT: Negative for congestion, dental problem, drooling, ear discharge, ear pain, facial swelling, hearing loss, mouth sores, nosebleeds, postnasal drip, rhinorrhea, sinus pressure, sinus pain, sneezing, sore throat, tinnitus, trouble swallowing and voice change.    Eyes: Negative for photophobia, pain, discharge, redness, itching and visual disturbance.   Respiratory: Negative for apnea, cough, choking, chest  tightness, shortness of breath, wheezing and stridor.    Cardiovascular: Negative for chest pain, palpitations and leg swelling.   Gastrointestinal: Negative for abdominal distention, abdominal pain, anal bleeding, blood in stool, constipation, diarrhea, nausea, rectal pain and vomiting.   Endocrine: Negative for cold intolerance, heat intolerance, polydipsia, polyphagia and polyuria.   Genitourinary: Positive for menstrual problem. Negative for decreased urine volume, difficulty urinating, dyspareunia, dysuria, enuresis, flank pain, frequency, genital sores, hematuria, pelvic pain, urgency, vaginal bleeding, vaginal discharge and vaginal pain.   Musculoskeletal: Negative for arthralgias, back pain, gait problem, joint swelling, myalgias, neck pain and neck stiffness.   Skin: Negative for color change, pallor, rash and wound.   Allergic/Immunologic: Negative for environmental allergies, food allergies and immunocompromised state.   Neurological: Negative for dizziness, tremors, seizures, syncope, facial asymmetry, speech difficulty, weakness, light-headedness, numbness and headaches.   Hematological: Negative for adenopathy. Does not bruise/bleed easily.   Psychiatric/Behavioral: Negative for agitation, behavioral problems, confusion, decreased concentration, dysphoric mood, hallucinations, self-injury, sleep disturbance and suicidal ideas. The patient is not nervous/anxious and is not hyperactive.        Objective   Physical Exam  Vitals and nursing note reviewed.   Constitutional:       Appearance: She is well-developed.   HENT:      Head: Normocephalic.   Eyes:      Pupils: Pupils are equal, round, and reactive to light.   Cardiovascular:      Rate and Rhythm: Normal rate and regular rhythm.   Pulmonary:      Effort: Pulmonary effort is normal.      Breath sounds: Normal breath sounds.   Abdominal:      Palpations: Abdomen is soft.   Musculoskeletal:         General: Normal range of motion.      Cervical back:  "Normal range of motion.   Skin:     General: Skin is warm and dry.   Neurological:      Mental Status: She is alert and oriented to person, place, and time.   Psychiatric:         Behavior: Behavior normal.           Vitals:    01/26/22 0930   BP: 122/78   Weight: 64.4 kg (142 lb)   Height: 160 cm (63\")       Diagnoses and all orders for this visit:    1. Dysfunctional uterine bleeding  Comments:  Patient here for F/U after starting low Loestrin related to irregular menses.  States periods are improved but still irregular.  Patient encouraged to continue as she has only been on this for 3 months.  Patient agrees to continue and will RTO in 3 months for follow-up or sooner as needed.  Orders:  -     Norethin-Eth Estrad-Fe Biphas (Lo Loestrin Fe) 1 MG-10 MCG / 10 MCG tablet; Take 1 tablet by mouth Daily for 28 days.  Dispense: 84 tablet; Refill: 2          Patient's Body mass index is 25.15 kg/m². indicating that she is overweight (BMI 25-29.9). Patient's (Body mass index is 25.15 kg/m².) indicates that they are overweight with health conditions that include none . Weight is unchanged. BMI is is above average; BMI management plan is completed. We discussed portion control and increasing exercise. .             Non-Smoker    MyChart Instructions Given       "

## 2022-04-27 ENCOUNTER — OFFICE VISIT (OUTPATIENT)
Dept: OBSTETRICS AND GYNECOLOGY | Facility: CLINIC | Age: 42
End: 2022-04-27

## 2022-04-27 VITALS
DIASTOLIC BLOOD PRESSURE: 72 MMHG | BODY MASS INDEX: 26.05 KG/M2 | HEIGHT: 63 IN | WEIGHT: 147 LBS | SYSTOLIC BLOOD PRESSURE: 120 MMHG

## 2022-04-27 DIAGNOSIS — N93.8 DYSFUNCTIONAL UTERINE BLEEDING: Primary | ICD-10-CM

## 2022-04-27 PROCEDURE — 99213 OFFICE O/P EST LOW 20 MIN: CPT | Performed by: NURSE PRACTITIONER

## 2022-04-27 RX ORDER — PROPRANOLOL HYDROCHLORIDE 20 MG/1
TABLET ORAL
COMMUNITY

## 2022-08-02 ENCOUNTER — TRANSCRIBE ORDERS (OUTPATIENT)
Dept: ADMINISTRATIVE | Facility: HOSPITAL | Age: 42
End: 2022-08-02

## 2022-08-02 DIAGNOSIS — Z12.31 ENCOUNTER FOR SCREENING MAMMOGRAM FOR MALIGNANT NEOPLASM OF BREAST: Primary | ICD-10-CM

## 2022-08-05 ENCOUNTER — HOSPITAL ENCOUNTER (OUTPATIENT)
Dept: MAMMOGRAPHY | Facility: HOSPITAL | Age: 42
Discharge: HOME | End: 2022-08-05
Admitting: NURSE PRACTITIONER

## 2022-08-05 DIAGNOSIS — Z12.31 ENCOUNTER FOR SCREENING MAMMOGRAM FOR MALIGNANT NEOPLASM OF BREAST: ICD-10-CM

## 2022-08-05 PROCEDURE — 77063 BREAST TOMOSYNTHESIS BI: CPT

## 2022-08-05 PROCEDURE — 77067 SCR MAMMO BI INCL CAD: CPT

## 2022-08-10 ENCOUNTER — OFFICE VISIT (OUTPATIENT)
Dept: OBSTETRICS AND GYNECOLOGY | Facility: CLINIC | Age: 42
End: 2022-08-10

## 2022-08-10 VITALS
BODY MASS INDEX: 26.93 KG/M2 | HEIGHT: 63 IN | SYSTOLIC BLOOD PRESSURE: 122 MMHG | DIASTOLIC BLOOD PRESSURE: 70 MMHG | WEIGHT: 152 LBS

## 2022-08-10 DIAGNOSIS — Z01.419 WELL WOMAN EXAM WITH ROUTINE GYNECOLOGICAL EXAM: Primary | ICD-10-CM

## 2022-08-10 DIAGNOSIS — Z12.4 CERVICAL CANCER SCREENING: ICD-10-CM

## 2022-08-10 DIAGNOSIS — N93.8 DYSFUNCTIONAL UTERINE BLEEDING: ICD-10-CM

## 2022-08-10 PROCEDURE — 99396 PREV VISIT EST AGE 40-64: CPT | Performed by: NURSE PRACTITIONER

## 2022-08-10 PROCEDURE — 87624 HPV HI-RISK TYP POOLED RSLT: CPT | Performed by: NURSE PRACTITIONER

## 2022-08-10 PROCEDURE — G0123 SCREEN CERV/VAG THIN LAYER: HCPCS | Performed by: NURSE PRACTITIONER

## 2022-08-10 PROCEDURE — 99213 OFFICE O/P EST LOW 20 MIN: CPT | Performed by: NURSE PRACTITIONER

## 2022-08-10 RX ORDER — DULOXETIN HYDROCHLORIDE 30 MG/1
CAPSULE, DELAYED RELEASE ORAL
COMMUNITY
End: 2022-08-10 | Stop reason: SDUPTHER

## 2022-08-10 RX ORDER — DIAZEPAM 2 MG/1
TABLET ORAL EVERY 12 HOURS SCHEDULED
COMMUNITY

## 2022-08-10 RX ORDER — NORETHINDRONE ACETATE AND ETHINYL ESTRADIOL, ETHINYL ESTRADIOL AND FERROUS FUMARATE 1MG-10(24)
1 KIT ORAL DAILY
Qty: 84 TABLET | Refills: 3 | Status: SHIPPED | OUTPATIENT
Start: 2022-08-10 | End: 2022-09-07

## 2022-08-10 NOTE — PROGRESS NOTES
Yvette Beltran is a 42 y.o. female  YOB: 1980        Chief Complaint   Patient presents with   • Annual Exam     Patient is here for annual exam last pap was performed on 06/15/21 and was normal. Last mammogram was performed on 08/05/22 patient has no complaints or concerns at this time.       Gynecologic Exam  The patient's pertinent negatives include no pelvic pain. Pertinent negatives include no abdominal pain, back pain, constipation, diarrhea, dysuria, fever, frequency, hematuria, nausea, rash, sore throat, urgency or vomiting.       The following portions of the patient's history were reviewed and updated as appropriate: allergies, current medications, past family history, past medical history, past social history, past surgical history, and problem list.    Allergies   Allergen Reactions   • Sulfa Antibiotics Hives     HIVES         Past Medical History:   Diagnosis Date   • Anxiety and depression    • Irritable bowel disease    • Lactose intolerance    • Seasonal allergies        Family History   Problem Relation Age of Onset   • Cirrhosis Mother    • No Known Problems Father    • No Known Problems Sister    • No Known Problems Brother    • Prostate cancer Maternal Grandfather    • Breast cancer Maternal Aunt    • Ovarian cancer Neg Hx    • Uterine cancer Neg Hx    • Colon cancer Neg Hx    • Melanoma Neg Hx    • Colon polyps Neg Hx        Social History     Socioeconomic History   • Marital status:    Tobacco Use   • Smoking status: Never Smoker   • Smokeless tobacco: Never Used   Vaping Use   • Vaping Use: Never used   Substance and Sexual Activity   • Alcohol use: Yes     Comment: occassional   • Drug use: No   • Sexual activity: Yes     Birth control/protection: OCP         Current Outpatient Medications:   •  cholecalciferol (VITAMIN D3) 1000 UNITS tablet, Take 3,000 Units by mouth Daily., Disp: , Rfl:   •  cyclobenzaprine (FLEXERIL) 10 MG tablet, Take 10 mg by  mouth As Needed., Disp: , Rfl:   •  diazePAM (VALIUM) 2 MG tablet, Every 12 (Twelve) Hours., Disp: , Rfl:   •  fluticasone (FLONASE) 50 MCG/ACT nasal spray, Flonase Allergy Relief 50 mcg/actuation nasal spray,suspension  Inhale 2 sprays every day by intranasal route as needed., Disp: , Rfl:   •  Loratadine 10 MG capsule, loratadine 10 mg capsule  Take 1 capsule every day by oral route., Disp: , Rfl:   •  Multiple Vitamins-Minerals (MULTIVITAMIN WITH MINERALS) tablet tablet, Take 1 tablet by mouth Daily., Disp: , Rfl:   •  Norethin-Eth Estrad-Fe Biphas (Lo Loestrin Fe) 1 MG-10 MCG / 10 MCG tablet, Take 1 tablet by mouth Daily for 28 days., Disp: 84 tablet, Rfl: 3  •  propranolol (INDERAL) 20 MG tablet, propranolol 20 mg tablet  TAKE 1 TABLET TWICE A DAY BY ORAL ROUTE AS NEEDED., Disp: , Rfl:   •  Trintellix 20 MG tablet, , Disp: , Rfl:     No LMP recorded. (Menstrual status: Oral contraceptives).    Sexual History:           Could not be calculated    Past Surgical History:   Procedure Laterality Date   • APPENDECTOMY     • AUGMENTATION MAMMAPLASTY     • BREAST AUGMENTATION     •  SECTION     • COLONOSCOPY  07/10/2012    Normal exam   • COLONOSCOPY N/A 06/15/2020    Procedure: COLONOSCOPY WITH ANESTHESIA;  Surgeon: Amadou Harden MD;  Location: [facility] ENDOSCOPY;  Service: Gastroenterology;  Laterality: N/A;  pre change in bowel habits  post normal  dr debbie zee   • HAND SURGERY      CUT HERSELF WITH KNIFE   • TONSILLECTOMY     • VAGINAL BIRTH AFTER  SECTION         Review of Systems   Constitutional: Negative for activity change, appetite change, fatigue, fever, unexpected weight gain and unexpected weight loss.   HENT: Negative for congestion, ear pain, hearing loss, nosebleeds, rhinorrhea, sore throat, tinnitus and trouble swallowing.    Eyes: Negative for blurred vision, pain, discharge, itching and visual disturbance.   Respiratory: Negative for apnea, chest tightness, shortness of breath  and wheezing.    Cardiovascular: Negative for chest pain and leg swelling.   Gastrointestinal: Negative for abdominal pain, blood in stool, constipation, diarrhea, nausea, vomiting and GERD.   Endocrine: Negative for heat intolerance, polydipsia and polyuria.   Genitourinary: Negative for breast lump, decreased libido, difficulty urinating, dyspareunia, dysuria, frequency, genital sores, hematuria, menstrual problem, pelvic pain, urgency, urinary incontinence and vaginal pain.   Musculoskeletal: Negative for arthralgias, back pain, joint swelling and myalgias.   Skin: Negative for color change, rash and skin lesions.   Allergic/Immunologic: Negative for environmental allergies, food allergies and immunocompromised state.   Neurological: Negative for dizziness, tremors, seizures, syncope, facial asymmetry, numbness and headache.   Hematological: Negative for adenopathy. Does not bruise/bleed easily.   Psychiatric/Behavioral: Negative for agitation, hallucinations, sleep disturbance, suicidal ideas and depressed mood. The patient is not nervous/anxious.        Objective   Physical Exam  Vitals and nursing note reviewed. Exam conducted with a chaperone present.   Constitutional:       General: She is not in acute distress.     Appearance: She is well-developed. She is not diaphoretic.   HENT:      Head: Normocephalic.      Right Ear: External ear normal.      Left Ear: External ear normal.      Nose: Nose normal.   Eyes:      General: No scleral icterus.        Right eye: No discharge.         Left eye: No discharge.      Conjunctiva/sclera: Conjunctivae normal.      Pupils: Pupils are equal, round, and reactive to light.   Neck:      Thyroid: No thyromegaly.      Vascular: No carotid bruit.      Trachea: No tracheal deviation.   Cardiovascular:      Rate and Rhythm: Normal rate and regular rhythm.      Heart sounds: Normal heart sounds. No murmur heard.  Pulmonary:      Effort: Pulmonary effort is normal. No  respiratory distress.      Breath sounds: Normal breath sounds. No wheezing.   Chest:   Breasts: Breasts are symmetrical.      Right: Normal. No swelling, bleeding, inverted nipple, mass, nipple discharge, skin change, tenderness, axillary adenopathy or supraclavicular adenopathy.      Left: Normal. No swelling, bleeding, inverted nipple, mass, nipple discharge, skin change, tenderness, axillary adenopathy or supraclavicular adenopathy.       Abdominal:      General: There is no distension.      Palpations: Abdomen is soft. There is no mass.      Tenderness: There is no abdominal tenderness. There is no right CVA tenderness, left CVA tenderness or guarding.      Hernia: No hernia is present. There is no hernia in the left inguinal area or right inguinal area.   Genitourinary:     General: Normal vulva.      Exam position: Lithotomy position.      Labia:         Right: No rash, tenderness, lesion or injury.         Left: No rash, tenderness, lesion or injury.       Vagina: Normal. No signs of injury and foreign body. No vaginal discharge, erythema, tenderness or bleeding.      Cervix: Normal.      Uterus: Normal. Not enlarged, not fixed and not tender.       Adnexa: Right adnexa normal and left adnexa normal.        Right: No mass, tenderness or fullness.          Left: No mass, tenderness or fullness.        Rectum: Normal. No mass.      Comments:   BSU normal  Urethral meatus  Normal  Perineum  Normal  Musculoskeletal:         General: No tenderness. Normal range of motion.      Cervical back: Normal range of motion and neck supple.   Lymphadenopathy:      Head:      Right side of head: No submental, submandibular, tonsillar, preauricular, posterior auricular or occipital adenopathy.      Left side of head: No submental, submandibular, tonsillar, preauricular, posterior auricular or occipital adenopathy.      Cervical: No cervical adenopathy.      Right cervical: No superficial, deep or posterior cervical  "adenopathy.     Left cervical: No superficial, deep or posterior cervical adenopathy.      Upper Body:      Right upper body: No supraclavicular, axillary or pectoral adenopathy.      Left upper body: No supraclavicular, axillary or pectoral adenopathy.      Lower Body: No right inguinal adenopathy. No left inguinal adenopathy.   Skin:     General: Skin is warm and dry.      Findings: No bruising, erythema or rash.   Neurological:      Mental Status: She is alert and oriented to person, place, and time.      Coordination: Coordination normal.   Psychiatric:         Mood and Affect: Mood normal.         Behavior: Behavior normal.         Thought Content: Thought content normal.         Judgment: Judgment normal.           Vitals:    08/10/22 1307   BP: 122/70   Weight: 68.9 kg (152 lb)   Height: 160 cm (62.99\")       Diagnoses and all orders for this visit:    1. Well woman exam with routine gynecological exam (Primary)  Comments:  Normal well woman exam.  ThinPrep Pap smear done.  Mammogram ordered.  Orders:  -     Liquid-based Pap Smear, Screening  -     HPV DNA Probe, Direct - ThinPrep Vial, Cervix    2. Cervical cancer screening  Comments:  ThinPrep Pap smear done.  Orders:  -     Liquid-based Pap Smear, Screening  -     HPV DNA Probe, Direct - ThinPrep Vial, Cervix    3. Dysfunctional uterine bleeding  Comments:  Patient here for F/U after starting low Loestrin related to irregular menses.  States periods are improved but still irregular at times.  Patient encouraged to continue x2 more months before deciding to discontinue it or change to a different OC.  Patient agreed.  Orders:  -     Norethin-Eth Estrad-Fe Biphas (Lo Loestrin Fe) 1 MG-10 MCG / 10 MCG tablet; Take 1 tablet by mouth Daily for 28 days.  Dispense: 84 tablet; Refill: 3        Normal GYN exam. Will have lab work here. Encouraged SBE.  Pt is aware how to do self breast exam and the importance of same. Discussed weight management and importance of " maintaining a healthy weight. Discussed Vitamin D intake and the importance of adequate vitamin D for both bone health and a healthy immune system.  Discussed daily exercise and the importance of same in regards to a healthy heart as well as helping to maintain her weight and improving her mental health.  Body mass index is 26.93 kg/m². Colonoscopy is not age appropriate.  Mammogram will be scheduled at [facility]. Pap smear is done per ASCCP guidelines.    BMI is >= 25 and <30. (Overweight) The following options were offered after discussion;: exercise counseling/recommendations and nutrition counseling/recommendations             Non-Smoker    MyChart Instructions Given

## 2022-08-11 ENCOUNTER — HOSPITAL ENCOUNTER (OUTPATIENT)
Dept: MAMMOGRAPHY | Facility: HOSPITAL | Age: 42
Discharge: HOME | End: 2022-08-11

## 2022-08-11 ENCOUNTER — HOSPITAL ENCOUNTER (OUTPATIENT)
Dept: ULTRASOUND IMAGING | Facility: HOSPITAL | Age: 42
Discharge: HOME | End: 2022-08-11

## 2022-08-11 DIAGNOSIS — R92.8 ABNORMAL MAMMOGRAM: ICD-10-CM

## 2022-08-11 LAB
GEN CATEG CVX/VAG CYTO-IMP: NORMAL
HPV I/H RISK 4 DNA CVX QL PROBE+SIG AMP: NOT DETECTED
LAB AP CASE REPORT: NORMAL
LAB AP GYN ADDITIONAL INFORMATION: NORMAL
LAB AP GYN OTHER FINDINGS: NORMAL
Lab: NORMAL
PATH INTERP SPEC-IMP: NORMAL
STAT OF ADQ CVX/VAG CYTO-IMP: NORMAL

## 2022-08-11 PROCEDURE — 77065 DX MAMMO INCL CAD UNI: CPT

## 2022-08-11 PROCEDURE — G0279 TOMOSYNTHESIS, MAMMO: HCPCS

## 2022-08-11 PROCEDURE — 76642 ULTRASOUND BREAST LIMITED: CPT

## 2022-09-09 ENCOUNTER — TELEPHONE (OUTPATIENT)
Dept: OBSTETRICS AND GYNECOLOGY | Facility: CLINIC | Age: 42
End: 2022-09-09

## 2022-09-12 ENCOUNTER — TELEPHONE (OUTPATIENT)
Dept: OBSTETRICS AND GYNECOLOGY | Facility: CLINIC | Age: 42
End: 2022-09-12

## 2022-09-12 DIAGNOSIS — R92.8 ABNORMAL MAMMOGRAM: Primary | ICD-10-CM

## 2022-10-07 ENCOUNTER — TELEPHONE (OUTPATIENT)
Dept: OBSTETRICS AND GYNECOLOGY | Facility: CLINIC | Age: 42
End: 2022-10-07

## 2022-10-07 DIAGNOSIS — R92.8 ABNORMAL MAMMOGRAM: Primary | ICD-10-CM

## 2022-10-07 NOTE — TELEPHONE ENCOUNTER
[facility] scheduling is needing a Breast US order to go with the diag mammo we ordered on 09/12/2022 for a 6 mo FU.

## 2023-01-11 NOTE — PROGRESS NOTES
Yvette Beltran is a 41 y.o. female  YOB: 1980      Chief Complaint   Patient presents with   • Contraception     Patient here to follow up on Lo Loestrin. Patient states bleeding is much improved and she did not have a period last month.        Contraception  Pertinent negatives include no abdominal pain, arthralgias, chest pain, chills, congestion, coughing, diaphoresis, fatigue, fever, headaches, joint swelling, myalgias, nausea, neck pain, numbness, rash, sore throat, vomiting or weakness.       The following portions of the patient's history were reviewed and updated as appropriate: allergies, current medications, past family history, past medical history, past social history, past surgical history, and problem list.    Allergies   Allergen Reactions   • Sulfa Antibiotics Hives     HIVES         Past Medical History:   Diagnosis Date   • Anxiety and depression    • Irritable bowel disease    • Lactose intolerance    • Seasonal allergies        Family History   Problem Relation Age of Onset   • Cirrhosis Mother    • No Known Problems Father    • No Known Problems Sister    • No Known Problems Brother    • Prostate cancer Maternal Grandfather    • Breast cancer Maternal Aunt    • Ovarian cancer Neg Hx    • Uterine cancer Neg Hx    • Colon cancer Neg Hx    • Melanoma Neg Hx    • Colon polyps Neg Hx        Social History     Socioeconomic History   • Marital status:    Tobacco Use   • Smoking status: Never Smoker   • Smokeless tobacco: Never Used   Vaping Use   • Vaping Use: Never used   Substance and Sexual Activity   • Alcohol use: Yes     Comment: occassional   • Drug use: No   • Sexual activity: Yes     Birth control/protection: OCP         Current Outpatient Medications:   •  cholecalciferol (VITAMIN D3) 1000 UNITS tablet, Take 3,000 Units by mouth Daily., Disp: , Rfl:   •  clonazePAM (KlonoPIN) 0.25 MG disintegrating tablet, Take 0.25 mg by mouth As Needed., Disp: , Rfl:  RN messaged Dr. Joseph via [employer] regarding patient had a bowel movement, but upper left quadrant is hard. It was softer earlier today. It is causing patient discomfort. RN did give morphine for the pain 10/10.     •  cyclobenzaprine (FLEXERIL) 10 MG tablet, Take 10 mg by mouth As Needed., Disp: , Rfl:   •  diazePAM (VALIUM) 2 MG tablet, diazepam 2 mg tablet, Disp: , Rfl:   •  fluticasone (FLONASE) 50 MCG/ACT nasal spray, Flonase Allergy Relief 50 mcg/actuation nasal spray,suspension  Inhale 2 sprays every day by intranasal route as needed., Disp: , Rfl:   •  Loratadine 10 MG capsule, loratadine 10 mg capsule  Take 1 capsule every day by oral route., Disp: , Rfl:   •  Multiple Vitamins-Minerals (MULTIVITAMIN WITH MINERALS) tablet tablet, Take 1 tablet by mouth Daily., Disp: , Rfl:   •  Norethin-Eth Estrad-Fe Biphas (Lo Loestrin Fe) 1 MG-10 MCG / 10 MCG tablet, Take 1 tablet by mouth Daily for 28 days., Disp: 84 tablet, Rfl: 2  •  propranolol (INDERAL) 20 MG tablet, propranolol 20 mg tablet  TAKE 1 TABLET TWICE A DAY BY ORAL ROUTE AS NEEDED., Disp: , Rfl:   •  Trintellix 20 MG tablet, , Disp: , Rfl:     No LMP recorded (lmp unknown). (Menstrual status: Oral contraceptives).    Sexual History:         Could not be calculated    Past Surgical History:   Procedure Laterality Date   • APPENDECTOMY     • AUGMENTATION MAMMAPLASTY     • BREAST AUGMENTATION     •  SECTION     • COLONOSCOPY  07/10/2012    Normal exam   • COLONOSCOPY N/A 6/15/2020    Procedure: COLONOSCOPY WITH ANESTHESIA;  Surgeon: Amadou Harden MD;  Location: [facility] ENDOSCOPY;  Service: Gastroenterology;  Laterality: N/A;  pre change in bowel habits  post normal  dr debbie zee   • HAND SURGERY      CUT HERSELF WITH KNIFE   • TONSILLECTOMY     • VAGINAL BIRTH AFTER  SECTION         Review of Systems   Constitutional: Negative for activity change, appetite change, chills, diaphoresis, fatigue, fever and unexpected weight change.   HENT: Negative for congestion, dental problem, drooling, ear discharge, ear pain, facial swelling, hearing loss, mouth sores, nosebleeds, postnasal drip, rhinorrhea, sinus pressure, sinus pain, sneezing, sore throat,  tinnitus, trouble swallowing and voice change.    Eyes: Negative for photophobia, pain, discharge, redness, itching and visual disturbance.   Respiratory: Negative for apnea, cough, choking, chest tightness, shortness of breath, wheezing and stridor.    Cardiovascular: Negative for chest pain, palpitations and leg swelling.   Gastrointestinal: Negative for abdominal distention, abdominal pain, anal bleeding, blood in stool, constipation, diarrhea, nausea, rectal pain and vomiting.   Endocrine: Negative for cold intolerance, heat intolerance, polydipsia, polyphagia and polyuria.   Genitourinary: Negative for decreased urine volume, difficulty urinating, dyspareunia, dysuria, enuresis, flank pain, frequency, genital sores, hematuria, menstrual problem, pelvic pain, urgency, vaginal bleeding, vaginal discharge and vaginal pain.   Musculoskeletal: Negative for arthralgias, back pain, gait problem, joint swelling, myalgias, neck pain and neck stiffness.   Skin: Negative for color change, pallor, rash and wound.   Allergic/Immunologic: Negative for environmental allergies, food allergies and immunocompromised state.   Neurological: Negative for dizziness, tremors, seizures, syncope, facial asymmetry, speech difficulty, weakness, light-headedness, numbness and headaches.   Hematological: Negative for adenopathy. Does not bruise/bleed easily.   Psychiatric/Behavioral: Negative for agitation, behavioral problems, confusion, decreased concentration, dysphoric mood, hallucinations, self-injury, sleep disturbance and suicidal ideas. The patient is not nervous/anxious and is not hyperactive.        Objective   Physical Exam  Vitals and nursing note reviewed.   Constitutional:       Appearance: She is well-developed.   HENT:      Head: Normocephalic.   Eyes:      Pupils: Pupils are equal, round, and reactive to light.   Cardiovascular:      Rate and Rhythm: Normal rate and regular rhythm.   Pulmonary:      Effort: Pulmonary  "effort is normal.      Breath sounds: Normal breath sounds.   Abdominal:      Palpations: Abdomen is soft.   Musculoskeletal:         General: Normal range of motion.      Cervical back: Normal range of motion.   Skin:     General: Skin is warm and dry.   Neurological:      Mental Status: She is alert and oriented to person, place, and time.   Psychiatric:         Behavior: Behavior normal.           Vitals:    04/27/22 0853   BP: 120/72   Weight: 66.7 kg (147 lb)   Height: 160 cm (62.99\")       Diagnoses and all orders for this visit:    1. Dysfunctional uterine bleeding (Primary)  Comments:  Patient here for follow-up after starting low Loestrin related to DUB.  Patient states bleeding is much improved and she actually did not have a period this month.  Wants to remain on it.  RTO in June for yearly or sooner as needed.          BMI is >= 25 and < 30. (Overweight) The following options were offered after discussion: exercise counseling/recommendations             Non-Smoker    MyChart Instructions Given       "

## 2023-03-07 ENCOUNTER — HOSPITAL ENCOUNTER (OUTPATIENT)
Dept: ULTRASOUND IMAGING | Facility: HOSPITAL | Age: 43
Discharge: HOME | End: 2023-03-07
Payer: COMMERCIAL

## 2023-03-07 ENCOUNTER — HOSPITAL ENCOUNTER (OUTPATIENT)
Dept: MAMMOGRAPHY | Facility: HOSPITAL | Age: 43
Discharge: HOME | End: 2023-03-07
Payer: COMMERCIAL

## 2023-03-07 DIAGNOSIS — R92.8 ABNORMAL MAMMOGRAM: ICD-10-CM

## 2023-03-07 PROCEDURE — 76642 ULTRASOUND BREAST LIMITED: CPT

## 2023-03-07 PROCEDURE — 77065 DX MAMMO INCL CAD UNI: CPT

## 2023-03-07 PROCEDURE — G0279 TOMOSYNTHESIS, MAMMO: HCPCS

## 2023-03-29 ENCOUNTER — OFFICE VISIT (OUTPATIENT)
Dept: OBSTETRICS AND GYNECOLOGY | Facility: CLINIC | Age: 43
End: 2023-03-29
Payer: COMMERCIAL

## 2023-03-29 VITALS
SYSTOLIC BLOOD PRESSURE: 128 MMHG | WEIGHT: 159 LBS | DIASTOLIC BLOOD PRESSURE: 88 MMHG | BODY MASS INDEX: 28.17 KG/M2 | HEIGHT: 63 IN

## 2023-03-29 DIAGNOSIS — R10.2 PELVIC PAIN: Primary | ICD-10-CM

## 2023-03-29 RX ORDER — VALACYCLOVIR HYDROCHLORIDE 1 G/1
1 TABLET, FILM COATED ORAL EVERY 12 HOURS SCHEDULED
COMMUNITY
Start: 2023-01-25

## 2023-03-29 RX ORDER — DULOXETIN HYDROCHLORIDE 60 MG/1
1 CAPSULE, DELAYED RELEASE ORAL DAILY
COMMUNITY
Start: 2023-03-16

## 2023-03-29 RX ORDER — SEMAGLUTIDE 0.5 MG/.5ML
INJECTION, SOLUTION SUBCUTANEOUS
COMMUNITY
Start: 2023-03-22

## 2023-03-29 NOTE — PROGRESS NOTES
"Chief Complaint   Patient presents with   • Pelvic Pain     Patient is here for evaluation of pelvic pain she has been having since 2-3 weeks ago.  Last well GYN exam 8/10/22 with Kim.  She reports pain was centrally located in uterine area and was significant.  No changes in bowel habits and denies constipation.  Hx of ovarian cysts and this pain was nothing like that pain.  LMP ended Monday.  This episode of pelvic pain had no bleeding or other associated symptoms.  Denies vaginal discharge or odor.       History:  Amanda Beltran is a 42 y.o. female who presents today for follow-up for evaluation of the above:    HPI     Patient presents today for evaluation of pelvic pain. Pain occurred 3 weeks ago she had an episode of \"severe\" middle of her pelvis pain. This lasted for several hours. She experienced pain with a bowel movement at the same time and about an hour after this BM the pain started to decrease. No rectal or vaginal bleeding occurred.   Described the pain as a pressure and more of a stabbing pain  No urinary pain  Periods have been normal  No fever.             ROS:  Review of Systems   Constitutional: Negative for fatigue and unexpected weight change.   HENT: Negative.    Eyes: Negative.    Respiratory: Negative.    Cardiovascular: Negative.    Gastrointestinal: Negative for abdominal pain, constipation and diarrhea.   Endocrine: Negative.    Genitourinary: Positive for pelvic pain. Negative for difficulty urinating, dyspareunia, genital sores, menstrual problem, vaginal bleeding, vaginal discharge and vaginal pain.   Musculoskeletal: Negative.    Skin: Negative.    Neurological: Negative.    Psychiatric/Behavioral: Negative.        Ms. Beltran  reports that she has never smoked. She has never used smokeless tobacco. She reports current alcohol use. She reports that she does not use drugs.      Current Outpatient Medications:   •  cholecalciferol (VITAMIN D3) 1000 UNITS tablet, Take 3 tablets by mouth " "Daily., Disp: , Rfl:   •  cyclobenzaprine (FLEXERIL) 10 MG tablet, Take 1 tablet by mouth As Needed., Disp: , Rfl:   •  diazePAM (VALIUM) 2 MG tablet, Every 12 (Twelve) Hours., Disp: , Rfl:   •  DULoxetine (CYMBALTA) 60 MG capsule, Take 1 capsule by mouth Daily., Disp: , Rfl:   •  fluticasone (FLONASE) 50 MCG/ACT nasal spray, Flonase Allergy Relief 50 mcg/actuation nasal spray,suspension  Inhale 2 sprays every day by intranasal route as needed., Disp: , Rfl:   •  Loratadine 10 MG capsule, loratadine 10 mg capsule  Take 1 capsule every day by oral route., Disp: , Rfl:   •  Multiple Vitamins-Minerals (MULTIVITAMIN WITH MINERALS) tablet tablet, Take 1 tablet by mouth Daily., Disp: , Rfl:   •  propranolol (INDERAL) 20 MG tablet, propranolol 20 mg tablet  TAKE 1 TABLET TWICE A DAY BY ORAL ROUTE AS NEEDED., Disp: , Rfl:   •  valACYclovir (VALTREX) 1000 MG tablet, Take 1 tablet by mouth Every 12 (Twelve) Hours., Disp: , Rfl:   •  Norethin-Eth Estrad-Fe Biphas (Lo Loestrin Fe) 1 MG-10 MCG / 10 MCG tablet, Take 1 tablet by mouth Daily for 28 days., Disp: 84 tablet, Rfl: 3  •  Wegovy 0.5 MG/0.5ML solution auto-injector, , Disp: , Rfl:       OBJECTIVE:  /88   Ht 158.8 cm (62.5\")   Wt 72.1 kg (159 lb)   LMP 03/24/2023 (Exact Date)   BMI 28.62 kg/m²    Physical Exam  Vitals reviewed.   Constitutional:       Appearance: She is well-developed.   HENT:      Head: Normocephalic and atraumatic.   Eyes:      Pupils: Pupils are equal, round, and reactive to light.   Cardiovascular:      Rate and Rhythm: Normal rate and regular rhythm.      Heart sounds: Normal heart sounds.   Pulmonary:      Effort: Pulmonary effort is normal.      Breath sounds: Normal breath sounds.   Abdominal:      General: Bowel sounds are normal.      Palpations: Abdomen is soft.      Tenderness: There is abdominal tenderness in the left lower quadrant.   Musculoskeletal:         General: Normal range of motion.      Cervical back: Normal range of " motion and neck supple.   Skin:     General: Skin is warm and dry.   Neurological:      Mental Status: She is alert and oriented to person, place, and time.         Assessment/Plan    Diagnoses and all orders for this visit:    1. Pelvic pain (Primary)    She experienced very mild left lower quadrant pain with palpation today.  Otherwise normal exam.  F/u with GYN US recommended.         An After Visit Summary was printed and given to the patient at discharge.  Return for with GYN US. Sooner if problems arise.          Amber CHAVEZ. 3/29/2023   Electronically Signed

## 2023-04-03 ENCOUNTER — TELEPHONE (OUTPATIENT)
Dept: OBSTETRICS AND GYNECOLOGY | Facility: CLINIC | Age: 43
End: 2023-04-03

## 2023-04-03 NOTE — TELEPHONE ENCOUNTER
Caller: Amanda Beltran    Relationship to patient: Self    Best call back number: [phone]    Patient is needing: PATIENT IS SCHEDULED FOR GYN F/U AND U/S ON 4/19/23. PATIENT WORKS AT [facility]. IF [facility]- HAS ANY OPENINGS SOONER THAN 4/19/23. PATIENT IS WILLING TO COME IN SOONER AND CAN GET U/S BEFORE SHE HAS TO COME IN FOR APPT. IF PROVIDER HAS ANY OPENINGS BEFORE 4/19/23

## 2023-04-11 ENCOUNTER — OFFICE VISIT (OUTPATIENT)
Dept: GASTROENTEROLOGY | Facility: CLINIC | Age: 43
End: 2023-04-11
Payer: COMMERCIAL

## 2023-04-11 ENCOUNTER — HOSPITAL ENCOUNTER (OUTPATIENT)
Dept: CT IMAGING | Facility: HOSPITAL | Age: 43
Discharge: HOME | End: 2023-04-11
Admitting: CLINICAL NURSE SPECIALIST
Payer: COMMERCIAL

## 2023-04-11 VITALS
WEIGHT: 162 LBS | BODY MASS INDEX: 28.7 KG/M2 | OXYGEN SATURATION: 99 % | SYSTOLIC BLOOD PRESSURE: 122 MMHG | HEIGHT: 63 IN | TEMPERATURE: 97.1 F | HEART RATE: 95 BPM | DIASTOLIC BLOOD PRESSURE: 70 MMHG

## 2023-04-11 DIAGNOSIS — Z78.9 NONSMOKER: ICD-10-CM

## 2023-04-11 DIAGNOSIS — R10.30 LOWER ABDOMINAL PAIN: Primary | ICD-10-CM

## 2023-04-11 DIAGNOSIS — R10.30 LOWER ABDOMINAL PAIN: ICD-10-CM

## 2023-04-11 PROCEDURE — 74177 CT ABD & PELVIS W/CONTRAST: CPT

## 2023-04-11 PROCEDURE — 25510000001 IOPAMIDOL 61 % SOLUTION: Performed by: CLINICAL NURSE SPECIALIST

## 2023-04-11 PROCEDURE — 99214 OFFICE O/P EST MOD 30 MIN: CPT | Performed by: CLINICAL NURSE SPECIALIST

## 2023-04-11 RX ADMIN — IOPAMIDOL 100 ML: 612 INJECTION, SOLUTION INTRAVENOUS at 12:53

## 2023-04-11 NOTE — PROGRESS NOTES
Amanda Beltran  1980  Chief Complaint   Patient presents with   • Abdominal Pain     Lower abdomen pelvic region pain     Subjective   HPI  Amanda Beltran is a 42 y.o. female who presents with a complaint of lower pelvic abdominal pain. Acute onset 10 out of 10 2 weeks ago while at work. Lasted 1 1/2 hours and has been slightly persistent since then. No triggers no alleviating factors. No radiation. No BRBPR. No hx of hemorrhoids.  No fever chills or sweats. She has taken Colace as well to see if this would help. No reflief. It is sharp stabbing and associated with some bloating as well. She has an ultrasound of her pelvis scheduled next week. She is up to date with her GYN exams.     Past Medical History:   Diagnosis Date   • Anxiety and depression    • Irritable bowel disease    • Lactose intolerance    • Seasonal allergies      Past Surgical History:   Procedure Laterality Date   • APPENDECTOMY     • AUGMENTATION MAMMAPLASTY     • BREAST AUGMENTATION     •  SECTION     • COLONOSCOPY  07/10/2012    Normal exam   • COLONOSCOPY N/A 06/15/2020    Tortuous colon otherwise normal exam repeat at age 52   • HAND SURGERY      CUT HERSELF WITH KNIFE   • TONSILLECTOMY     • VAGINAL BIRTH AFTER  SECTION         Outpatient Medications Marked as Taking for the 23 encounter (Office Visit) with Lachelle Greco APRN   Medication Sig Dispense Refill   • cholecalciferol (VITAMIN D3) 1000 UNITS tablet Take 3 tablets by mouth Daily.     • cyclobenzaprine (FLEXERIL) 10 MG tablet Take 1 tablet by mouth As Needed.     • diazePAM (VALIUM) 2 MG tablet Every 12 (Twelve) Hours.     • DULoxetine (CYMBALTA) 60 MG capsule Take 1 capsule by mouth Daily.     • fluticasone (FLONASE) 50 MCG/ACT nasal spray Flonase Allergy Relief 50 mcg/actuation nasal spray,suspension   Inhale 2 sprays every day by intranasal route as needed.     • Loratadine 10 MG capsule loratadine 10 mg capsule   Take 1 capsule  every day by oral route.     • Multiple Vitamins-Minerals (MULTIVITAMIN WITH MINERALS) tablet tablet Take 1 tablet by mouth Daily.     • propranolol (INDERAL) 20 MG tablet propranolol 20 mg tablet   TAKE 1 TABLET TWICE A DAY BY ORAL ROUTE AS NEEDED.     • valACYclovir (VALTREX) 1000 MG tablet Take 1 tablet by mouth Every 12 (Twelve) Hours.       Allergies   Allergen Reactions   • Sulfa Antibiotics Hives     HIVES       Social History     Socioeconomic History   • Marital status:    Tobacco Use   • Smoking status: Never   • Smokeless tobacco: Never   Vaping Use   • Vaping Use: Never used   Substance and Sexual Activity   • Alcohol use: Yes     Comment: occassional   • Drug use: No   • Sexual activity: Yes     Birth control/protection: OCP     Family History   Problem Relation Age of Onset   • Cirrhosis Mother    • No Known Problems Father    • No Known Problems Sister    • No Known Problems Brother    • Prostate cancer Maternal Grandfather    • Breast cancer Maternal Aunt    • Ovarian cancer Neg Hx    • Uterine cancer Neg Hx    • Colon cancer Neg Hx    • Melanoma Neg Hx    • Colon polyps Neg Hx      Health Maintenance   Topic Date Due   • TDAP/TD VACCINES (1 - Tdap) Never done   • HEPATITIS C SCREENING  Never done   • ANNUAL PHYSICAL  Never done   • COVID-19 Vaccine (4 - Booster for Moderna series) 01/28/2022   • INFLUENZA VACCINE  08/01/2023   • PAP SMEAR  08/10/2025   • Pneumococcal Vaccine 0-64  Aged Out     Review of Systems   Constitutional: Negative for activity change, appetite change, chills, diaphoresis, fatigue, fever and unexpected weight change.   HENT: Negative for ear pain, hearing loss, mouth sores, sore throat, trouble swallowing and voice change.    Eyes: Negative.    Respiratory: Negative for cough, choking, shortness of breath and wheezing.    Cardiovascular: Negative for chest pain and palpitations.   Gastrointestinal: Positive for abdominal distention and abdominal pain. Negative for blood  "in stool, constipation, diarrhea, nausea and vomiting.   Endocrine: Negative for cold intolerance and heat intolerance.   Genitourinary: Negative for decreased urine volume, dysuria, frequency, hematuria and urgency.   Musculoskeletal: Negative for back pain, gait problem and myalgias.   Skin: Negative for color change, pallor and rash.   Allergic/Immunologic: Negative for food allergies and immunocompromised state.   Neurological: Negative for dizziness, tremors, seizures, syncope, weakness, light-headedness, numbness and headaches.   Hematological: Negative for adenopathy. Does not bruise/bleed easily.   Psychiatric/Behavioral: Negative for agitation and confusion. The patient is not nervous/anxious.    All other systems reviewed and are negative.    Objective   Vitals:    04/11/23 0901   BP: 122/70   Pulse: 95   Temp: 97.1 °F (36.2 °C)   TempSrc: Temporal   SpO2: 99%   Weight: 73.5 kg (162 lb)   Height: 158.8 cm (62.5\")     Body mass index is 29.16 kg/m².  Physical Exam  Constitutional:       Appearance: She is well-developed.   HENT:      Head: Normocephalic and atraumatic.   Eyes:      Pupils: Pupils are equal, round, and reactive to light.   Neck:      Trachea: No tracheal deviation.   Cardiovascular:      Rate and Rhythm: Normal rate and regular rhythm.      Heart sounds: Normal heart sounds. No murmur heard.    No friction rub. No gallop.   Pulmonary:      Effort: Pulmonary effort is normal. No respiratory distress.      Breath sounds: Normal breath sounds. No wheezing or rales.   Chest:      Chest wall: No tenderness.   Abdominal:      General: Bowel sounds are normal. There is no distension.      Palpations: Abdomen is soft. Abdomen is not rigid.      Tenderness: There is abdominal tenderness (lower LLQ tenderness). There is no guarding or rebound.   Musculoskeletal:         General: No tenderness or deformity. Normal range of motion.      Cervical back: Normal range of motion and neck supple.   Skin:     " General: Skin is warm and dry.      Coloration: Skin is not pale.      Findings: No rash.   Neurological:      Mental Status: She is alert and oriented to person, place, and time.      Deep Tendon Reflexes: Reflexes are normal and symmetric.   Psychiatric:         Behavior: Behavior normal.         Thought Content: Thought content normal.         Judgment: Judgment normal.       Assessment & Plan   Diagnoses and all orders for this visit:    1. Lower abdominal pain (Primary)  -     Cancel: CT Abdomen Pelvis With Contrast; Future  -     CT Abdomen Pelvis With Contrast; Future    2. Nonsmoker        Part of this note may be an electronic transcription/translation of spoken language to printed text using the Dragon Dictation System.  Body mass index is 29.16 kg/m².  Return in about 2 weeks (around 4/25/2023).    BMI is >= 25 and <30. (Overweight) The following options were offered after discussion;: weight loss educational material (shared in after visit summary)      All risks, benefits, alternatives, and indications of colonoscopy and/or Endoscopy procedure have been discussed with the patient. Risks to include perforation of the colon requiring possible surgery or colostomy, risk of bleeding from biopsies or removal of colon tissue, possibility of missing a colon polyp or cancer, or adverse drug reaction.  Benefits to include the diagnosis and management of disease of the colon and rectum. Alternatives to include barium enema, radiographic evaluation, lab testing or no intervention. Pt verbalizes understanding and agrees.     Lachelle Greco, APRN  4/11/2023  09:29 CDT          If you smoke or use tobacco, 4 minutes reading provided  Steps to Quit Smoking  Smoking tobacco can be harmful to your health and can affect almost every organ in your body. Smoking puts you, and those around you, at risk for developing many serious chronic diseases. Quitting smoking is difficult, but it is one of the best things that you  can do for your health. It is never too late to quit.  What are the benefits of quitting smoking?  When you quit smoking, you lower your risk of developing serious diseases and conditions, such as:  · Lung cancer or lung disease, such as COPD.  · Heart disease.  · Stroke.  · Heart attack.  · Infertility.  · Osteoporosis and bone fractures.  Additionally, symptoms such as coughing, wheezing, and shortness of breath may get better when you quit. You may also find that you get sick less often because your body is stronger at fighting off colds and infections. If you are pregnant, quitting smoking can help to reduce your chances of having a baby of low birth weight.  How do I get ready to quit?  When you decide to quit smoking, create a plan to make sure that you are successful. Before you quit:  · Pick a date to quit. Set a date within the next two weeks to give you time to prepare.  · Write down the reasons why you are quitting. Keep this list in places where you will see it often, such as on your bathroom mirror or in your car or wallet.  · Identify the people, places, things, and activities that make you want to smoke (triggers) and avoid them. Make sure to take these actions:  ¨ Throw away all cigarettes at home, at work, and in your car.  ¨ Throw away smoking accessories, such as ashtrays and lighters.  ¨ Clean your car and make sure to empty the ashtray.  ¨ Clean your home, including curtains and carpets.  · Tell your family, friends, and coworkers that you are quitting. Support from your loved ones can make quitting easier.  · Talk with your health care provider about your options for quitting smoking.  · Find out what treatment options are covered by your health insurance.  What strategies can I use to quit smoking?  Talk with your healthcare provider about different strategies to quit smoking. Some strategies include:  · Quitting smoking altogether instead of gradually lessening how much you smoke over a  period of time. Research shows that quitting “cold turkey” is more successful than gradually quitting.  · Attending in-person counseling to help you build problem-solving skills. You are more likely to have success in quitting if you attend several counseling sessions. Even short sessions of 10 minutes can be effective.  · Finding resources and support systems that can help you to quit smoking and remain smoke-free after you quit. These resources are most helpful when you use them often. They can include:  ¨ Online chats with a counselor.  ¨ Telephone quitlines.  ¨ Printed self-help materials.  ¨ Support groups or group counseling.  ¨ Text messaging programs.  ¨ Mobile phone applications.  · Taking medicines to help you quit smoking. (If you are pregnant or breastfeeding, talk with your health care provider first.) Some medicines contain nicotine and some do not. Both types of medicines help with cravings, but the medicines that include nicotine help to relieve withdrawal symptoms. Your health care provider may recommend:  ¨ Nicotine patches, gum, or lozenges.  ¨ Nicotine inhalers or sprays.  ¨ Non-nicotine medicine that is taken by mouth.  Talk with your health care provider about combining strategies, such as taking medicines while you are also receiving in-person counseling. Using these two strategies together makes you more likely to succeed in quitting than if you used either strategy on its own.  If you are pregnant or breastfeeding, talk with your health care provider about finding counseling or other support strategies to quit smoking. Do not take medicine to help you quit smoking unless told to do so by your health care provider.  What things can I do to make it easier to quit?  Quitting smoking might feel overwhelming at first, but there is a lot that you can do to make it easier. Take these important actions:  · Reach out to your family and friends and ask that they support and encourage you during this  time. Call telephone quitlines, reach out to support groups, or work with a counselor for support.  · Ask people who smoke to avoid smoking around you.  · Avoid places that trigger you to smoke, such as bars, parties, or smoke-break areas at work.  · Spend time around people who do not smoke.  · Lessen stress in your life, because stress can be a smoking trigger for some people. To lessen stress, try:  ¨ Exercising regularly.  ¨ Deep-breathing exercises.  ¨ Yoga.  ¨ Meditating.  ¨ Performing a body scan. This involves closing your eyes, scanning your body from head to toe, and noticing which parts of your body are particularly tense. Purposefully relax the muscles in those areas.  · Download or purchase mobile phone or tablet apps (applications) that can help you stick to your quit plan by providing reminders, tips, and encouragement. There are many free apps, such as QuitGuide from the CDC (Centers for Disease Control and Prevention). You can find other support for quitting smoking (smoking cessation) through smokefree.gov and other websites.  How will I feel when I quit smoking?  Within the first 24 hours of quitting smoking, you may start to feel some withdrawal symptoms. These symptoms are usually most noticeable 2-3 days after quitting, but they usually do not last beyond 2-3 weeks. Changes or symptoms that you might experience include:  · Mood swings.  · Restlessness, anxiety, or irritation.  · Difficulty concentrating.  · Dizziness.  · Strong cravings for sugary foods in addition to nicotine.  · Mild weight gain.  · Constipation.  · Nausea.  · Coughing or a sore throat.  · Changes in how your medicines work in your body.  · A depressed mood.  · Difficulty sleeping (insomnia).  After the first 2-3 weeks of quitting, you may start to notice more positive results, such as:  · Improved sense of smell and taste.  · Decreased coughing and sore throat.  · Slower heart rate.  · Lower blood pressure.  · Clearer  skin.  · The ability to breathe more easily.  · Fewer sick days.  Quitting smoking is very challenging for most people. Do not get discouraged if you are not successful the first time. Some people need to make many attempts to quit before they achieve long-term success. Do your best to stick to your quit plan, and talk with your health care provider if you have any questions or concerns.  This information is not intended to replace advice given to you by your health care provider. Make sure you discuss any questions you have with your health care provider.  Document Released: 12/12/2002 Document Revised: 08/15/2017 Document Reviewed: 05/03/2016  [employer] Interactive Patient Education © 2017 [employer]

## 2023-04-19 ENCOUNTER — OFFICE VISIT (OUTPATIENT)
Dept: OBSTETRICS AND GYNECOLOGY | Facility: CLINIC | Age: 43
End: 2023-04-19
Payer: COMMERCIAL

## 2023-04-19 VITALS
HEIGHT: 63 IN | BODY MASS INDEX: 28.7 KG/M2 | DIASTOLIC BLOOD PRESSURE: 70 MMHG | SYSTOLIC BLOOD PRESSURE: 122 MMHG | WEIGHT: 162 LBS

## 2023-04-19 DIAGNOSIS — R10.2 PELVIC PAIN: Primary | ICD-10-CM

## 2023-04-19 DIAGNOSIS — R93.89 ABNORMAL FINDING ON ULTRASOUND: ICD-10-CM

## 2023-04-19 NOTE — PROGRESS NOTES
Chief Complaint   Patient presents with   • Pelvic Pain     Patient is here to follow up on pelvic pain that was last evaluated 3/29/23.  Patient also had CT Abdomen/Pelvis 4/11/23 as ordered by GI specialist, that showed large stool volume/constipation and multiple small left ovarian follicles.  TVUS in office today shows collapsed follicle on left ovary. She reports pelvic pain yesterday, and was the first time she's had it again since the last time.       History:  Amadna Beltran is a 42 y.o. female who presents today for follow-up for evaluation of the above:    HPI     Patient presents today for follow-up on pelvic pain.  She had a CT abdomen pelvis last week that did show considerable constipation.  Ultrasound the office today for follow-up of pelvic pain that was seen in office visit in March.  She reports an episode of pelvic pain yesterday but states she is not having pain today.  She has been consistently taking her OCP.          ROS:  Review of Systems   Constitutional: Negative for fatigue and unexpected weight change.   HENT: Negative.    Eyes: Negative.    Respiratory: Negative.    Cardiovascular: Negative.    Gastrointestinal: Positive for constipation. Negative for abdominal pain and diarrhea.   Endocrine: Negative.    Genitourinary: Positive for pelvic pain. Negative for difficulty urinating, dyspareunia, genital sores, menstrual problem, vaginal bleeding, vaginal discharge and vaginal pain.   Musculoskeletal: Negative.    Skin: Negative.    Neurological: Negative.    Psychiatric/Behavioral: Negative.        Ms. Beltran  reports that she has never smoked. She has never used smokeless tobacco. She reports current alcohol use. She reports that she does not use drugs.      Current Outpatient Medications:   •  cholecalciferol (VITAMIN D3) 1000 UNITS tablet, Take 3 tablets by mouth Daily., Disp: , Rfl:   •  cyclobenzaprine (FLEXERIL) 10 MG tablet, Take 1 tablet by mouth As Needed., Disp: , Rfl:   •   "diazePAM (VALIUM) 2 MG tablet, Every 12 (Twelve) Hours., Disp: , Rfl:   •  DULoxetine (CYMBALTA) 60 MG capsule, Take 1 capsule by mouth Daily., Disp: , Rfl:   •  fluticasone (FLONASE) 50 MCG/ACT nasal spray, Flonase Allergy Relief 50 mcg/actuation nasal spray,suspension  Inhale 2 sprays every day by intranasal route as needed., Disp: , Rfl:   •  Loratadine 10 MG capsule, loratadine 10 mg capsule  Take 1 capsule every day by oral route., Disp: , Rfl:   •  Multiple Vitamins-Minerals (MULTIVITAMIN WITH MINERALS) tablet tablet, Take 1 tablet by mouth Daily., Disp: , Rfl:   •  propranolol (INDERAL) 20 MG tablet, propranolol 20 mg tablet  TAKE 1 TABLET TWICE A DAY BY ORAL ROUTE AS NEEDED., Disp: , Rfl:   •  valACYclovir (VALTREX) 1000 MG tablet, Take 1 tablet by mouth Every 12 (Twelve) Hours., Disp: , Rfl:   •  Wegovy 0.5 MG/0.5ML solution auto-injector, , Disp: , Rfl:   •  Norethin-Eth Estrad-Fe Biphas (Lo Loestrin Fe) 1 MG-10 MCG / 10 MCG tablet, Take 1 tablet by mouth Daily for 28 days., Disp: 84 tablet, Rfl: 3      OBJECTIVE:  /70   Ht 158.8 cm (62.5\")   Wt 73.5 kg (162 lb)   LMP 03/24/2023 (Exact Date)   BMI 29.16 kg/m²    Physical Exam  Vitals reviewed.   Constitutional:       Appearance: She is well-developed.   HENT:      Head: Normocephalic and atraumatic.   Eyes:      Pupils: Pupils are equal, round, and reactive to light.   Cardiovascular:      Rate and Rhythm: Normal rate and regular rhythm.      Heart sounds: Normal heart sounds.   Pulmonary:      Effort: Pulmonary effort is normal.      Breath sounds: Normal breath sounds.   Musculoskeletal:         General: Normal range of motion.      Cervical back: Normal range of motion and neck supple.   Skin:     General: Skin is warm and dry.   Neurological:      Mental Status: She is alert and oriented to person, place, and time.         Assessment/Plan    Diagnoses and all orders for this visit:    1. Pelvic pain (Primary)  2. Abnormal finding on " ultrasound  Comments:  Ultrasound is reviewed with the patient in the office.  Left ovary has multiple follicles.  Otherwise normal ultrasound.  Small amount of fluid in left adnexa consistent with possible rupture of a small ovarian cyst or follicle.      Patient feels that her abdominal pain is likely due to her constipation.  She is working with GI specialist for resolution of this concern.  If she continues to have pelvic pain with resolution of constipation we did discuss the possibility of changing her OCP to a 20 mcg pill.  At this time we will continue current therapy.       An After Visit Summary was printed and given to the patient at discharge.  Return if symptoms worsen or fail to improve, for Next scheduled follow up. Sooner if problems arise.          Amber Faulkner APRN. 4/19/2023   Electronically Signed

## 2023-04-25 ENCOUNTER — LAB (OUTPATIENT)
Dept: LAB | Facility: HOSPITAL | Age: 43
End: 2023-04-25
Payer: COMMERCIAL

## 2023-04-25 ENCOUNTER — OFFICE VISIT (OUTPATIENT)
Dept: GASTROENTEROLOGY | Facility: CLINIC | Age: 43
End: 2023-04-25
Payer: COMMERCIAL

## 2023-04-25 VITALS
HEIGHT: 63 IN | WEIGHT: 162 LBS | SYSTOLIC BLOOD PRESSURE: 120 MMHG | HEART RATE: 90 BPM | OXYGEN SATURATION: 98 % | DIASTOLIC BLOOD PRESSURE: 70 MMHG | TEMPERATURE: 97.8 F | BODY MASS INDEX: 28.7 KG/M2

## 2023-04-25 DIAGNOSIS — K59.01 SLOW TRANSIT CONSTIPATION: ICD-10-CM

## 2023-04-25 DIAGNOSIS — R10.30 LOWER ABDOMINAL PAIN: Primary | ICD-10-CM

## 2023-04-25 DIAGNOSIS — Z78.9 NONSMOKER: ICD-10-CM

## 2023-04-25 PROCEDURE — 99214 OFFICE O/P EST MOD 30 MIN: CPT | Performed by: CLINICAL NURSE SPECIALIST

## 2023-04-25 PROCEDURE — 86231 EMA EACH IG CLASS: CPT | Performed by: CLINICAL NURSE SPECIALIST

## 2023-04-25 PROCEDURE — 36415 COLL VENOUS BLD VENIPUNCTURE: CPT | Performed by: CLINICAL NURSE SPECIALIST

## 2023-04-25 PROCEDURE — 82784 ASSAY IGA/IGD/IGG/IGM EACH: CPT | Performed by: CLINICAL NURSE SPECIALIST

## 2023-04-25 PROCEDURE — 86364 TISS TRNSGLTMNASE EA IG CLAS: CPT | Performed by: CLINICAL NURSE SPECIALIST

## 2023-04-25 NOTE — PROGRESS NOTES
Amanda Beltran  1980  Chief Complaint   Patient presents with   • GI Problem     Lower abdominal pain     Subjective   HPI  Amanda Beltran is a 42 y.o. female who presents with a complaint of constipation and abdominal pain lower abdomen severe at times. Acute onset rated 10 out of 10. I ordered a CT scan  IMPRESSION:     1.  Moderate to large volume stool throughout the colon. Correlate for  constipation. No bowel obstruction or evidence of active bowel  inflammation.     2.  Multiple small LEFT ovarian cysts/follicles measuring up to 1.5 cm,  likely physiologic in a patient of this age.     This report was finalized on 2023 13:08 by Dr. Rui Lama MD.    I put her on Miralax and she is having bowel movements they are soft and daily she says. No bleeding. She has had another episode of severe pain last week before the Miralax had kicked in. She says this week is better. No bleeding. Her last colonoscopy was in . She has taken Bentyl in the past for IBS and severe cramping and this is different that that time.     Past Medical History:   Diagnosis Date   • Anxiety and depression    • Irritable bowel disease    • Lactose intolerance    • Seasonal allergies      Past Surgical History:   Procedure Laterality Date   • APPENDECTOMY     • AUGMENTATION MAMMAPLASTY     • BREAST AUGMENTATION     •  SECTION     • COLONOSCOPY  07/10/2012    Normal exam   • COLONOSCOPY N/A 06/15/2020    Tortuous colon otherwise normal exam repeat at age 52   • HAND SURGERY      CUT HERSELF WITH KNIFE   • TONSILLECTOMY     • VAGINAL BIRTH AFTER  SECTION         Outpatient Medications Marked as Taking for the 23 encounter (Office Visit) with Lachelle Greco APRN   Medication Sig Dispense Refill   • cholecalciferol (VITAMIN D3) 1000 UNITS tablet Take 3 tablets by mouth Daily.     • cyclobenzaprine (FLEXERIL) 10 MG tablet Take 1 tablet by mouth As Needed.     • diazePAM (VALIUM) 2 MG  tablet Every 12 (Twelve) Hours.     • DULoxetine (CYMBALTA) 60 MG capsule Take 1 capsule by mouth Daily.     • fluticasone (FLONASE) 50 MCG/ACT nasal spray Flonase Allergy Relief 50 mcg/actuation nasal spray,suspension   Inhale 2 sprays every day by intranasal route as needed.     • Loratadine 10 MG capsule loratadine 10 mg capsule   Take 1 capsule every day by oral route.     • Multiple Vitamins-Minerals (MULTIVITAMIN WITH MINERALS) tablet tablet Take 1 tablet by mouth Daily.     • propranolol (INDERAL) 20 MG tablet propranolol 20 mg tablet   TAKE 1 TABLET TWICE A DAY BY ORAL ROUTE AS NEEDED.     • valACYclovir (VALTREX) 1000 MG tablet Take 1 tablet by mouth Every 12 (Twelve) Hours.     • Wegovy 0.5 MG/0.5ML solution auto-injector        Allergies   Allergen Reactions   • Sulfa Antibiotics Hives     HIVES       Social History     Socioeconomic History   • Marital status:    Tobacco Use   • Smoking status: Never   • Smokeless tobacco: Never   Vaping Use   • Vaping Use: Never used   Substance and Sexual Activity   • Alcohol use: Yes     Comment: occassional   • Drug use: No   • Sexual activity: Yes     Birth control/protection: OCP     Family History   Problem Relation Age of Onset   • Cirrhosis Mother    • No Known Problems Father    • No Known Problems Sister    • No Known Problems Brother    • Prostate cancer Maternal Grandfather    • Breast cancer Maternal Aunt    • Ovarian cancer Neg Hx    • Uterine cancer Neg Hx    • Colon cancer Neg Hx    • Melanoma Neg Hx    • Colon polyps Neg Hx      Health Maintenance   Topic Date Due   • TDAP/TD VACCINES (1 - Tdap) Never done   • HEPATITIS C SCREENING  Never done   • ANNUAL PHYSICAL  Never done   • COVID-19 Vaccine (4 - Booster for Moderna series) 01/28/2022   • INFLUENZA VACCINE  08/01/2023   • PAP SMEAR  08/10/2025   • Pneumococcal Vaccine 0-64  Aged Out     Review of Systems   Constitutional: Negative for activity change, appetite change, chills, diaphoresis,  "fatigue, fever and unexpected weight change.   HENT: Negative for ear pain, hearing loss, mouth sores, sore throat, trouble swallowing and voice change.    Eyes: Negative.    Respiratory: Negative for cough, choking, shortness of breath and wheezing.    Cardiovascular: Negative for chest pain and palpitations.   Gastrointestinal: Positive for abdominal pain and constipation. Negative for blood in stool, diarrhea, nausea and vomiting.   Endocrine: Negative for cold intolerance and heat intolerance.   Genitourinary: Negative for decreased urine volume, dysuria, frequency, hematuria and urgency.   Musculoskeletal: Negative for back pain, gait problem and myalgias.   Skin: Negative for color change, pallor and rash.   Allergic/Immunologic: Negative for food allergies and immunocompromised state.   Neurological: Negative for dizziness, tremors, seizures, syncope, weakness, light-headedness, numbness and headaches.   Hematological: Negative for adenopathy. Does not bruise/bleed easily.   Psychiatric/Behavioral: Negative for agitation and confusion. The patient is not nervous/anxious.    All other systems reviewed and are negative.    Objective   Vitals:    04/25/23 0940   BP: 120/70   Pulse: 90   Temp: 97.8 °F (36.6 °C)   TempSrc: Temporal   SpO2: 98%   Weight: 73.5 kg (162 lb)   Height: 158.8 cm (62.5\")     Body mass index is 29.16 kg/m².  Physical Exam  Constitutional:       Appearance: She is well-developed.   HENT:      Head: Normocephalic and atraumatic.   Eyes:      Pupils: Pupils are equal, round, and reactive to light.   Neck:      Trachea: No tracheal deviation.   Cardiovascular:      Rate and Rhythm: Normal rate and regular rhythm.      Heart sounds: Normal heart sounds. No murmur heard.    No friction rub. No gallop.   Pulmonary:      Effort: Pulmonary effort is normal. No respiratory distress.      Breath sounds: Normal breath sounds. No wheezing or rales.   Chest:      Chest wall: No tenderness.   Abdominal: "      General: Bowel sounds are normal. There is no distension.      Palpations: Abdomen is soft. Abdomen is not rigid.      Tenderness: There is no abdominal tenderness. There is no guarding or rebound.   Musculoskeletal:         General: No tenderness or deformity. Normal range of motion.      Cervical back: Normal range of motion and neck supple.   Skin:     General: Skin is warm and dry.      Coloration: Skin is not pale.      Findings: No rash.   Neurological:      Mental Status: She is alert and oriented to person, place, and time.      Deep Tendon Reflexes: Reflexes are normal and symmetric.   Psychiatric:         Behavior: Behavior normal.         Thought Content: Thought content normal.         Judgment: Judgment normal.       Assessment & Plan   Diagnoses and all orders for this visit:    1. Lower abdominal pain (Primary)  -     Case Request; Standing  -     Follow Anesthesia Guidelines / Protocol; Future  -     Obtain Informed Consent; Future  -     Implement Anesthesia Orders Day of Procedure; Standing  -     Obtain Informed Consent; Standing  -     Verify bowel prep was successful; Standing  -     Case Request    2. Slow transit constipation  -     Celiac Disease Panel    3. Nonsmoker    we discusssed this being more of her IBS and or constipation as the source of her symptoms she agrees. To continue Miralax daily  Pelvic floor dysfunction discussed as well and she agrees there is not much urge to go and this could be a possiblity.     COLONOSCOPY WITH ANESTHESIA (N/A)  Part of this note may be an electronic transcription/translation of spoken language to printed text using the Dragon Dictation System.  Body mass index is 29.16 kg/m².  Return in about 3 months (around 7/25/2023).    BMI is >= 25 and <30. (Overweight) The following options were offered after discussion;: weight loss educational material (shared in after visit summary)      All risks, benefits, alternatives, and indications of colonoscopy  and/or Endoscopy procedure have been discussed with the patient. Risks to include perforation of the colon requiring possible surgery or colostomy, risk of bleeding from biopsies or removal of colon tissue, possibility of missing a colon polyp or cancer, or adverse drug reaction.  Benefits to include the diagnosis and management of disease of the colon and rectum. Alternatives to include barium enema, radiographic evaluation, lab testing or no intervention. Pt verbalizes understanding and agrees.     Lachelle Greco, APRN  4/25/2023  10:21 CDT          If you smoke or use tobacco, 4 minutes reading provided  Steps to Quit Smoking  Smoking tobacco can be harmful to your health and can affect almost every organ in your body. Smoking puts you, and those around you, at risk for developing many serious chronic diseases. Quitting smoking is difficult, but it is one of the best things that you can do for your health. It is never too late to quit.  What are the benefits of quitting smoking?  When you quit smoking, you lower your risk of developing serious diseases and conditions, such as:  · Lung cancer or lung disease, such as COPD.  · Heart disease.  · Stroke.  · Heart attack.  · Infertility.  · Osteoporosis and bone fractures.  Additionally, symptoms such as coughing, wheezing, and shortness of breath may get better when you quit. You may also find that you get sick less often because your body is stronger at fighting off colds and infections. If you are pregnant, quitting smoking can help to reduce your chances of having a baby of low birth weight.  How do I get ready to quit?  When you decide to quit smoking, create a plan to make sure that you are successful. Before you quit:  · Pick a date to quit. Set a date within the next two weeks to give you time to prepare.  · Write down the reasons why you are quitting. Keep this list in places where you will see it often, such as on your bathroom mirror or in your car or  wallet.  · Identify the people, places, things, and activities that make you want to smoke (triggers) and avoid them. Make sure to take these actions:  ¨ Throw away all cigarettes at home, at work, and in your car.  ¨ Throw away smoking accessories, such as ashtrays and lighters.  ¨ Clean your car and make sure to empty the ashtray.  ¨ Clean your home, including curtains and carpets.  · Tell your family, friends, and coworkers that you are quitting. Support from your loved ones can make quitting easier.  · Talk with your health care provider about your options for quitting smoking.  · Find out what treatment options are covered by your health insurance.  What strategies can I use to quit smoking?  Talk with your healthcare provider about different strategies to quit smoking. Some strategies include:  · Quitting smoking altogether instead of gradually lessening how much you smoke over a period of time. Research shows that quitting “cold turkey” is more successful than gradually quitting.  · Attending in-person counseling to help you build problem-solving skills. You are more likely to have success in quitting if you attend several counseling sessions. Even short sessions of 10 minutes can be effective.  · Finding resources and support systems that can help you to quit smoking and remain smoke-free after you quit. These resources are most helpful when you use them often. They can include:  ¨ Online chats with a counselor.  ¨ Telephone quitlines.  ¨ Printed self-help materials.  ¨ Support groups or group counseling.  ¨ Text messaging programs.  ¨ Mobile phone applications.  · Taking medicines to help you quit smoking. (If you are pregnant or breastfeeding, talk with your health care provider first.) Some medicines contain nicotine and some do not. Both types of medicines help with cravings, but the medicines that include nicotine help to relieve withdrawal symptoms. Your health care provider may recommend:  ¨ Nicotine  patches, gum, or lozenges.  ¨ Nicotine inhalers or sprays.  ¨ Non-nicotine medicine that is taken by mouth.  Talk with your health care provider about combining strategies, such as taking medicines while you are also receiving in-person counseling. Using these two strategies together makes you more likely to succeed in quitting than if you used either strategy on its own.  If you are pregnant or breastfeeding, talk with your health care provider about finding counseling or other support strategies to quit smoking. Do not take medicine to help you quit smoking unless told to do so by your health care provider.  What things can I do to make it easier to quit?  Quitting smoking might feel overwhelming at first, but there is a lot that you can do to make it easier. Take these important actions:  · Reach out to your family and friends and ask that they support and encourage you during this time. Call telephone quitlines, reach out to support groups, or work with a counselor for support.  · Ask people who smoke to avoid smoking around you.  · Avoid places that trigger you to smoke, such as bars, parties, or smoke-break areas at work.  · Spend time around people who do not smoke.  · Lessen stress in your life, because stress can be a smoking trigger for some people. To lessen stress, try:  ¨ Exercising regularly.  ¨ Deep-breathing exercises.  ¨ Yoga.  ¨ Meditating.  ¨ Performing a body scan. This involves closing your eyes, scanning your body from head to toe, and noticing which parts of your body are particularly tense. Purposefully relax the muscles in those areas.  · Download or purchase mobile phone or tablet apps (applications) that can help you stick to your quit plan by providing reminders, tips, and encouragement. There are many free apps, such as QuitGuide from the CDC (Centers for Disease Control and Prevention). You can find other support for quitting smoking (smoking cessation) through smokefree.gov and other  websites.  How will I feel when I quit smoking?  Within the first 24 hours of quitting smoking, you may start to feel some withdrawal symptoms. These symptoms are usually most noticeable 2-3 days after quitting, but they usually do not last beyond 2-3 weeks. Changes or symptoms that you might experience include:  · Mood swings.  · Restlessness, anxiety, or irritation.  · Difficulty concentrating.  · Dizziness.  · Strong cravings for sugary foods in addition to nicotine.  · Mild weight gain.  · Constipation.  · Nausea.  · Coughing or a sore throat.  · Changes in how your medicines work in your body.  · A depressed mood.  · Difficulty sleeping (insomnia).  After the first 2-3 weeks of quitting, you may start to notice more positive results, such as:  · Improved sense of smell and taste.  · Decreased coughing and sore throat.  · Slower heart rate.  · Lower blood pressure.  · Clearer skin.  · The ability to breathe more easily.  · Fewer sick days.  Quitting smoking is very challenging for most people. Do not get discouraged if you are not successful the first time. Some people need to make many attempts to quit before they achieve long-term success. Do your best to stick to your quit plan, and talk with your health care provider if you have any questions or concerns.  This information is not intended to replace advice given to you by your health care provider. Make sure you discuss any questions you have with your health care provider.  Document Released: 12/12/2002 Document Revised: 08/15/2017 Document Reviewed: 05/03/2016  [employer] Interactive Patient Education © 2017 [employer]

## 2023-04-26 LAB
ENDOMYSIUM IGA SER QL: NEGATIVE
IGA SERPL-MCNC: 95 MG/DL (ref 87–352)
TTG IGA SER-ACNC: <2 U/ML (ref 0–3)

## 2023-06-15 ENCOUNTER — ANESTHESIA (OUTPATIENT)
Dept: GASTROENTEROLOGY | Facility: HOSPITAL | Age: 43
End: 2023-06-15
Payer: COMMERCIAL

## 2023-06-15 ENCOUNTER — ANESTHESIA EVENT (OUTPATIENT)
Dept: GASTROENTEROLOGY | Facility: HOSPITAL | Age: 43
End: 2023-06-15
Payer: COMMERCIAL

## 2023-06-15 ENCOUNTER — HOSPITAL ENCOUNTER (OUTPATIENT)
Facility: HOSPITAL | Age: 43
Setting detail: HOSPITAL OUTPATIENT SURGERY
Discharge: HOME | End: 2023-06-15
Attending: INTERNAL MEDICINE | Admitting: INTERNAL MEDICINE
Payer: COMMERCIAL

## 2023-06-15 VITALS
OXYGEN SATURATION: 100 % | DIASTOLIC BLOOD PRESSURE: 74 MMHG | BODY MASS INDEX: 26.75 KG/M2 | WEIGHT: 151 LBS | SYSTOLIC BLOOD PRESSURE: 113 MMHG | HEART RATE: 73 BPM | HEIGHT: 63 IN | TEMPERATURE: 97.8 F | RESPIRATION RATE: 11 BRPM

## 2023-06-15 DIAGNOSIS — R10.30 LOWER ABDOMINAL PAIN: ICD-10-CM

## 2023-06-15 LAB — B-HCG UR QL: NEGATIVE

## 2023-06-15 PROCEDURE — 81025 URINE PREGNANCY TEST: CPT

## 2023-06-15 PROCEDURE — 25010000002 PROPOFOL 10 MG/ML EMULSION: Performed by: NURSE ANESTHETIST, CERTIFIED REGISTERED

## 2023-06-15 RX ORDER — PROPOFOL 10 MG/ML
VIAL (ML) INTRAVENOUS AS NEEDED
Status: DISCONTINUED | OUTPATIENT
Start: 2023-06-15 | End: 2023-06-15 | Stop reason: SURG

## 2023-06-15 RX ORDER — LIDOCAINE HYDROCHLORIDE 20 MG/ML
INJECTION, SOLUTION EPIDURAL; INFILTRATION; INTRACAUDAL; PERINEURAL AS NEEDED
Status: DISCONTINUED | OUTPATIENT
Start: 2023-06-15 | End: 2023-06-15 | Stop reason: SURG

## 2023-06-15 RX ORDER — SODIUM CHLORIDE 0.9 % (FLUSH) 0.9 %
10 SYRINGE (ML) INJECTION AS NEEDED
Status: DISCONTINUED | OUTPATIENT
Start: 2023-06-15 | End: 2023-06-15 | Stop reason: HOSPADM

## 2023-06-15 RX ORDER — SODIUM CHLORIDE 9 MG/ML
500 INJECTION, SOLUTION INTRAVENOUS CONTINUOUS PRN
Status: DISCONTINUED | OUTPATIENT
Start: 2023-06-15 | End: 2023-06-15 | Stop reason: HOSPADM

## 2023-06-15 RX ADMIN — PROPOFOL INJECTABLE EMULSION 280 MG: 10 INJECTION, EMULSION INTRAVENOUS at 07:46

## 2023-06-15 RX ADMIN — LIDOCAINE HYDROCHLORIDE 60 MG: 20 INJECTION, SOLUTION EPIDURAL; INFILTRATION; INTRACAUDAL; PERINEURAL at 07:46

## 2023-06-15 RX ADMIN — SODIUM CHLORIDE 500 ML: 9 INJECTION, SOLUTION INTRAVENOUS at 07:12

## 2023-06-15 NOTE — H&P
[facility]  Pre Procedure History & Physical    Chief Complaint:   Abdominal pain    Subjective     HPI:   Here for colonoscopy.  Abdominal pain    Past Medical History:   Past Medical History:   Diagnosis Date    Anxiety and depression     Irritable bowel disease     Lactose intolerance     Seasonal allergies        Past Surgical History:  Past Surgical History:   Procedure Laterality Date    APPENDECTOMY      AUGMENTATION MAMMAPLASTY  2015    BREAST AUGMENTATION       SECTION      COLONOSCOPY  07/10/2012    Normal exam    COLONOSCOPY N/A 06/15/2020    Tortuous colon otherwise normal exam repeat at age 52    HAND SURGERY      CUT HERSELF WITH KNIFE    TONSILLECTOMY      VAGINAL BIRTH AFTER  SECTION         Family History:  Family History   Problem Relation Age of Onset    Cirrhosis Mother     No Known Problems Father     No Known Problems Sister     No Known Problems Brother     Prostate cancer Maternal Grandfather     Breast cancer Maternal Aunt     Ovarian cancer Neg Hx     Uterine cancer Neg Hx     Colon cancer Neg Hx     Melanoma Neg Hx     Colon polyps Neg Hx        Social History:   reports that she has never smoked. She has never used smokeless tobacco. She reports current alcohol use. She reports that she does not use drugs.    Medications:   Prior to Admission medications    Medication Sig Start Date End Date Taking? Authorizing Provider   cholecalciferol (VITAMIN D3) 1000 UNITS tablet Take 3 tablets by mouth Daily.   Yes Darrian Hendricks MD   diazePAM (VALIUM) 2 MG tablet Every 12 (Twelve) Hours.   Yes Darrian Hendricks MD   DULoxetine (CYMBALTA) 60 MG capsule Take 1 capsule by mouth Daily. 3/16/23  Yes Darrian Hendricks MD   fluticasone (FLONASE) 50 MCG/ACT nasal spray Flonase Allergy Relief 50 mcg/actuation nasal spray,suspension   Inhale 2 sprays every day by intranasal route as needed.   Yes Darrian Hendricks MD   Loratadine 10 MG capsule loratadine  "10 mg capsule   Take 1 capsule every day by oral route.   Yes Provider, MD Darrian   Multiple Vitamins-Minerals (MULTIVITAMIN WITH MINERALS) tablet tablet Take 1 tablet by mouth Daily.   Yes ProviderDarrian MD   Norethin-Eth Estrad-Fe Biphas (Lo Loestrin Fe) 1 MG-10 MCG / 10 MCG tablet Take 1 tablet by mouth Daily for 28 days. 8/10/22 6/15/23 Yes Kim Bradley APRN   propranolol (INDERAL) 20 MG tablet propranolol 20 mg tablet   TAKE 1 TABLET TWICE A DAY BY ORAL ROUTE AS NEEDED.   Yes Darrian Hendricks MD   Wegovy 0.5 MG/0.5ML solution auto-injector  3/22/23  Yes Darrian Hendricks MD   valACYclovir (VALTREX) 1000 MG tablet Take 1 tablet by mouth Every 12 (Twelve) Hours. 1/25/23   Darrian Hendricks MD   cyclobenzaprine (FLEXERIL) 10 MG tablet Take 1 tablet by mouth As Needed.  6/15/23  Emergency, Nurse John, RN       Allergies:  Sulfa antibiotics    Objective     Blood pressure 128/77, pulse 112, temperature 97.8 °F (36.6 °C), temperature source Temporal, resp. rate 18, height 158.8 cm (62.5\"), weight 68.5 kg (151 lb), SpO2 98 %, not currently breastfeeding.    Physical Exam   Constitutional: Pt is oriented to person, place, and in no distress.   HENT: Mouth/Throat: Oropharynx is clear.   Cardiovascular: Normal rate, regular rhythm.    Pulmonary/Chest: Effort normal. No respiratory distress. No  wheezes.   Abdominal: Soft. Non-distended.  Skin: Skin is warm and dry.   Psychiatric: Mood, memory, affect and judgment appear normal.     Assessment & Plan     Diagnosis:  Abdominal pain    Anticipated Surgical Procedure:    Proceed with colonoscopy as scheduled    The following major R/B/A were discussed with the patient, however the list is not all inclusive . Risk:  Bleeding (immediate and delayed), perforation (rupture or tear), reaction to medication, missed lesion/cancer, pain during the procedure, infection, need for surgery, need for ostomy, need for mechanical ventilation (breathing " machine), death.  Benefits: removal of polyp/tissue, burn/clip/or inject to stop bleeding, removal of foreign body, dilate any stricture.  Alternatives: Xray or CT, surgery, do nothing with associated risk   The patient was given time to ask question and received explanation, and agrees to proceed as per History and Physical.   No guarantee given or expressed.    EMR Dragon/transcription disclaimer: Much of this encounter note is an electronic transcription/translation of spoken language to printed text.  The electronic translation of spoken language may permit erroneous, or at times, nonsensical words or phrases to be inadvertently transcribed.  Although I have reviewed the note for such errors, some may still exist.    Amadou Harden MD  07:47 CDT  6/15/2023

## 2023-06-15 NOTE — ANESTHESIA PREPROCEDURE EVALUATION
Anesthesia Evaluation     history of anesthetic complications:  PONV  NPO Solid Status: > 8 hours  NPO Liquid Status: > 2 hours           Airway   Mallampati: I  TM distance: >3 FB  Neck ROM: full  No difficulty expected  Dental      Pulmonary    Cardiovascular   Exercise tolerance: good (4-7 METS)    (-) hypertension, CAD      Neuro/Psych  (+) psychiatric history Anxiety  (-) seizures, TIA, CVA  GI/Hepatic/Renal/Endo    (-) liver disease, no renal disease, diabetes    Musculoskeletal     Abdominal    Substance History      OB/GYN          Other                        Anesthesia Plan    ASA 2     MAC     intravenous induction     Anesthetic plan, risks, benefits, and alternatives have been provided, discussed and informed consent has been obtained with: patient.    CODE STATUS:

## 2023-06-15 NOTE — ANESTHESIA POSTPROCEDURE EVALUATION
"Patient: Amanda Beltran    Procedure Summary       Date: 06/15/23 Room / Location: [facility] ENDOSCOPY 6 /  [facility]    Anesthesia Start: 0745 Anesthesia Stop: 0810    Procedure: COLONOSCOPY WITH ANESTHESIA Diagnosis:       Lower abdominal pain      (Lower abdominal pain [R10.30])    Surgeons: Amadou Harden MD Provider: Sulma Celeste CRNA    Anesthesia Type: MAC ASA Status: 2            Anesthesia Type: MAC    Vitals  Vitals Value Taken Time   /66 06/15/23 0806   Temp     Pulse 86 06/15/23 0810   Resp 13 06/15/23 0805   SpO2 100 % 06/15/23 0810   Vitals shown include unvalidated device data.        Post Anesthesia Care and Evaluation    Patient location during evaluation: PACU  Patient participation: complete - patient participated  Level of consciousness: awake and alert  Pain management: adequate    Airway patency: patent  Anesthetic complications: No anesthetic complications    Cardiovascular status: acceptable  Respiratory status: acceptable  Hydration status: acceptable    Comments: Blood pressure 104/66, pulse 112, temperature 97.8 °F (36.6 °C), temperature source Temporal, resp. rate 13, height 158.8 cm (62.5\"), weight 68.5 kg (151 lb), SpO2 98 %, not currently breastfeeding.    Pt discharged from PACU based on louis score >8    "

## 2023-08-14 ENCOUNTER — OFFICE VISIT (OUTPATIENT)
Dept: OBSTETRICS AND GYNECOLOGY | Facility: CLINIC | Age: 43
End: 2023-08-14
Payer: COMMERCIAL

## 2023-08-14 ENCOUNTER — TELEPHONE (OUTPATIENT)
Dept: OBSTETRICS AND GYNECOLOGY | Facility: CLINIC | Age: 43
End: 2023-08-14
Payer: COMMERCIAL

## 2023-08-14 VITALS
HEIGHT: 63 IN | WEIGHT: 147 LBS | DIASTOLIC BLOOD PRESSURE: 80 MMHG | SYSTOLIC BLOOD PRESSURE: 112 MMHG | BODY MASS INDEX: 26.05 KG/M2

## 2023-08-14 DIAGNOSIS — Z12.31 ENCOUNTER FOR SCREENING MAMMOGRAM FOR BREAST CANCER: ICD-10-CM

## 2023-08-14 DIAGNOSIS — Z12.4 ENCOUNTER FOR SCREENING FOR CERVICAL CANCER: ICD-10-CM

## 2023-08-14 DIAGNOSIS — R92.8 ABNORMAL MAMMOGRAM: Primary | ICD-10-CM

## 2023-08-14 DIAGNOSIS — Z01.419 ENCOUNTER FOR WELL WOMAN EXAM WITH ROUTINE GYNECOLOGICAL EXAM: Primary | ICD-10-CM

## 2023-08-14 DIAGNOSIS — R92.0 MICROCALCIFICATION OF LEFT BREAST ON MAMMOGRAM: ICD-10-CM

## 2023-08-14 DIAGNOSIS — N93.8 DYSFUNCTIONAL UTERINE BLEEDING: ICD-10-CM

## 2023-08-14 PROCEDURE — 87624 HPV HI-RISK TYP POOLED RSLT: CPT | Performed by: NURSE PRACTITIONER

## 2023-08-14 PROCEDURE — G0123 SCREEN CERV/VAG THIN LAYER: HCPCS | Performed by: NURSE PRACTITIONER

## 2023-08-14 RX ORDER — NORETHINDRONE ACETATE AND ETHINYL ESTRADIOL, ETHINYL ESTRADIOL AND FERROUS FUMARATE 1MG-10(24)
1 KIT ORAL DAILY
Qty: 84 TABLET | Refills: 3 | Status: SHIPPED | OUTPATIENT
Start: 2023-08-14 | End: 2023-09-11

## 2023-08-14 RX ORDER — SEMAGLUTIDE 1 MG/.5ML
INJECTION, SOLUTION SUBCUTANEOUS
COMMUNITY
Start: 2023-08-10

## 2023-08-14 NOTE — TELEPHONE ENCOUNTER
[facility] is needing this patients mammo order to be corrected. Instead of a Mammo diagnostic left with CAD they are needing it changed to Mammo Diagnostic w/ Hardeep. Once corrected I will route back for scheduling.

## 2023-08-14 NOTE — PROGRESS NOTES
Yvette Beltran is a 43 y.o. female  YOB: 1980        Chief Complaint   Patient presents with    Gynecologic Exam     Patient here for annual, last pap 8/10/22, last mammogram 8/5/22, patient reports a lot of night sweats, patient states that last month she basically bled for the entire month.        Gynecologic Exam  The patient's pertinent negatives include no pelvic pain. Pertinent negatives include no abdominal pain, back pain, constipation, diarrhea, dysuria, fever, frequency, hematuria, nausea, rash, sore throat, urgency or vomiting.     The following portions of the patient's history were reviewed and updated as appropriate: allergies, current medications, past family history, past medical history, past social history, past surgical history, and problem list.    Allergies   Allergen Reactions    Sulfa Antibiotics Hives     HIVES         Past Medical History:   Diagnosis Date    Anxiety and depression     Irritable bowel disease     Lactose intolerance     Seasonal allergies        Family History   Problem Relation Age of Onset    Cirrhosis Mother     No Known Problems Father     No Known Problems Sister     No Known Problems Brother     Prostate cancer Maternal Grandfather     Breast cancer Maternal Aunt     Ovarian cancer Neg Hx     Uterine cancer Neg Hx     Colon cancer Neg Hx     Melanoma Neg Hx     Colon polyps Neg Hx        Social History     Socioeconomic History    Marital status:    Tobacco Use    Smoking status: Never    Smokeless tobacco: Never   Vaping Use    Vaping Use: Never used   Substance and Sexual Activity    Alcohol use: Yes     Comment: occ    Drug use: No    Sexual activity: Yes     Partners: Male     Birth control/protection: OCP         Current Outpatient Medications:     cholecalciferol (VITAMIN D3) 1000 UNITS tablet, Take 3 tablets by mouth Daily., Disp: , Rfl:     diazePAM (VALIUM) 2 MG tablet, Take 1 tablet by mouth Every 12 (Twelve) Hours As  Needed for Anxiety., Disp: , Rfl:     DULoxetine (CYMBALTA) 60 MG capsule, Take 1 capsule by mouth Daily., Disp: , Rfl:     fluticasone (FLONASE) 50 MCG/ACT nasal spray, Flonase Allergy Relief 50 mcg/actuation nasal spray,suspension  Inhale 2 sprays every day by intranasal route as needed., Disp: , Rfl:     Loratadine 10 MG capsule, Take 1 capsule by mouth Daily., Disp: , Rfl:     Multiple Vitamins-Minerals (MULTIVITAMIN WITH MINERALS) tablet tablet, Take 1 tablet by mouth Daily., Disp: , Rfl:     Norethin-Eth Estrad-Fe Biphas (Lo Loestrin Fe) 1 MG-10 MCG / 10 MCG tablet, Take 1 tablet by mouth Daily for 28 days., Disp: 84 tablet, Rfl: 3    propranolol (INDERAL) 20 MG tablet, propranolol 20 mg tablet  TAKE 1 TABLET TWICE A DAY BY ORAL ROUTE AS NEEDED., Disp: , Rfl:     valACYclovir (VALTREX) 1000 MG tablet, Take 1 tablet by mouth 2 (Two) Times a Day As Needed (cold sore)., Disp: , Rfl:     Wegovy 1 MG/0.5ML solution auto-injector, , Disp: , Rfl:     Patient's last menstrual period was 2023.    Sexual History:           Could not be calculated    Past Surgical History:   Procedure Laterality Date    APPENDECTOMY      AUGMENTATION MAMMAPLASTY      BREAST AUGMENTATION       SECTION      COLONOSCOPY  07/10/2012    Normal exam    COLONOSCOPY N/A 06/15/2020    Tortuous colon otherwise normal exam repeat at age 52    COLONOSCOPY N/A 06/15/2023    Normal exam repeat in 7-10 years    HAND SURGERY      CUT HERSELF WITH KNIFE    LEG EXCISION LESION/CYST Bilateral 2023    Procedure: EXCISIONAL BIOPSY OF RIGHT  THIRD TOE LESION,,AND LEFT SECOND AND THIRD TOE LESION;  Surgeon: Parth Easton MD;  Location: [facility] OR;  Service: Plastics;  Laterality: Bilateral;    TONSILLECTOMY      VAGINAL BIRTH AFTER  SECTION         Review of Systems   Constitutional:  Negative for activity change, appetite change, fatigue, fever, unexpected weight gain and unexpected weight loss.   HENT:  Negative for  congestion, ear pain, hearing loss, nosebleeds, rhinorrhea, sore throat, tinnitus and trouble swallowing.    Eyes:  Negative for blurred vision, pain, discharge, itching and visual disturbance.   Respiratory:  Negative for apnea, chest tightness, shortness of breath and wheezing.    Cardiovascular:  Negative for chest pain and leg swelling.   Gastrointestinal:  Negative for abdominal pain, blood in stool, constipation, diarrhea, nausea, vomiting and GERD.   Endocrine: Negative for heat intolerance, polydipsia and polyuria.   Genitourinary: Negative.  Negative for breast lump, decreased libido, difficulty urinating, dyspareunia, dysuria, frequency, genital sores, hematuria, menstrual problem, pelvic pain, urgency, urinary incontinence and vaginal pain.   Musculoskeletal:  Negative for arthralgias, back pain, joint swelling and myalgias.   Skin:  Negative for color change, rash and skin lesions.   Allergic/Immunologic: Negative for environmental allergies, food allergies and immunocompromised state.   Neurological:  Negative for dizziness, tremors, seizures, syncope, facial asymmetry, numbness and headache.   Hematological:  Negative for adenopathy. Does not bruise/bleed easily.   Psychiatric/Behavioral:  Negative for agitation, hallucinations, sleep disturbance, suicidal ideas and depressed mood. The patient is not nervous/anxious.      Objective   Physical Exam  Vitals reviewed.   Constitutional:       General: She is not in acute distress.     Appearance: She is well-developed. She is not ill-appearing.   HENT:      Head: Normocephalic.      Right Ear: External ear normal.      Left Ear: External ear normal.      Nose: Nose normal.      Mouth/Throat:      Pharynx: No oropharyngeal exudate.   Eyes:      General: No scleral icterus.        Right eye: No discharge.         Left eye: No discharge.      Conjunctiva/sclera: Conjunctivae normal.      Pupils: Pupils are equal, round, and reactive to light.   Neck:       Thyroid: No thyroid mass or thyromegaly.   Cardiovascular:      Rate and Rhythm: Normal rate and regular rhythm.      Heart sounds: Normal heart sounds. No murmur heard.  Pulmonary:      Effort: Pulmonary effort is normal. No respiratory distress.      Breath sounds: Normal breath sounds. No wheezing or rales.   Chest:      Chest wall: No tenderness.   Abdominal:      General: Bowel sounds are normal. There is no distension.      Palpations: Abdomen is soft. There is no mass.      Tenderness: There is no abdominal tenderness. There is no guarding or rebound.      Hernia: No hernia is present.   Genitourinary:     Exam position: Prone.      Labia:         Right: No rash, tenderness or lesion.         Left: No rash, tenderness, lesion or injury.       Vagina: Normal. No vaginal discharge or tenderness.      Cervix: No cervical motion tenderness, discharge or friability.      Uterus: Not enlarged and not tender.       Adnexa:         Right: No mass or tenderness.          Left: No mass or tenderness.        Comments: Urethra and urethral meatus normal.    Bladder - normal, no prolapse.  Perineum and rectum examined - intact and no lesions.    Musculoskeletal:         General: No tenderness or deformity. Normal range of motion.      Cervical back: Normal range of motion and neck supple.   Lymphadenopathy:      Cervical: No cervical adenopathy.   Skin:     General: Skin is warm and dry.      Coloration: Skin is not pale.      Findings: No erythema or rash.   Neurological:      Mental Status: She is alert and oriented to person, place, and time.      Motor: No abnormal muscle tone.      Coordination: Coordination normal.      Deep Tendon Reflexes: Reflexes are normal and symmetric.   Psychiatric:         Behavior: Behavior normal. Behavior is cooperative.         Thought Content: Thought content normal.         Judgment: Judgment normal.         Vitals:    08/14/23 0911   BP: 112/80   BP Location: Left arm   Patient  "Position: Sitting   Cuff Size: Adult   Weight: 66.7 kg (147 lb)   Height: 160 cm (63\")       Diagnoses and all orders for this visit:    1. Encounter for well woman exam with routine gynecological exam (Primary)  Comments:  Normal well woman exam.  ThinPrep pap smear done.  Mammogram ordered.    2. Encounter for screening for cervical cancer  Comments:  ThinPrep pap smear done.  Orders:  -     Liquid-based Pap Smear, Screening  -     HPV DNA Probe, Direct - ThinPrep Vial, Cervix, Endocervix    3. Encounter for screening mammogram for breast cancer  Comments:  Mammogram ordered.  Orders:  -     Mammo screening modified with tomosynthesis right w CAD; Future    4. Dysfunctional uterine bleeding  Comments:  Patient states she is much improved on Lo Loestrin and wants to remain on it.  Refill sent to pharmacy.  Orders:  -     Norethin-Eth Estrad-Fe Biphas (Lo Loestrin Fe) 1 MG-10 MCG / 10 MCG tablet; Take 1 tablet by mouth Daily for 28 days.  Dispense: 84 tablet; Refill: 3    5. Microcalcification of left breast on mammogram  Comments:  Recommended additional imaging ordered.  Orders:  -     Cancel: Mammo Diagnostic Left With CAD; Future  -     US breast left limited; Future        Normal GYN exam. Will have lab work here. Encouraged SBE.  Pt is aware how to do self breast exam and the importance of same. Discussed weight management and importance of maintaining a healthy weight. Discussed Vitamin D intake and the importance of adequate vitamin D for both bone health and a healthy immune system.  Discussed daily exercise and the importance of same in regards to a healthy heart as well as helping to maintain her weight and improving her mental health.  Body mass index is 26.04 kg/mý. Colonoscopy is not age appropriate.  Mammogram will be scheduled at [facility]. Pap smear is done per [facility]P guidelines.                  Non-Smoker    MyChart Instructions Given       "

## 2023-08-15 LAB
GEN CATEG CVX/VAG CYTO-IMP: NORMAL
HPV I/H RISK 4 DNA CVX QL PROBE+SIG AMP: NOT DETECTED
LAB AP CASE REPORT: NORMAL
LAB AP GYN ADDITIONAL INFORMATION: NORMAL
Lab: NORMAL
PATH INTERP SPEC-IMP: NORMAL
STAT OF ADQ CVX/VAG CYTO-IMP: NORMAL

## 2023-08-20 LAB
NCCN CRITERIA FLAG: NORMAL
TYRER CUZICK SCORE: 13.4

## 2023-08-30 ENCOUNTER — HOSPITAL ENCOUNTER (OUTPATIENT)
Dept: MAMMOGRAPHY | Facility: HOSPITAL | Age: 43
Discharge: HOME | End: 2023-08-30
Payer: COMMERCIAL

## 2023-09-06 ENCOUNTER — HOSPITAL ENCOUNTER (OUTPATIENT)
Dept: MAMMOGRAPHY | Facility: HOSPITAL | Age: 43
Discharge: HOME | End: 2023-09-06
Payer: COMMERCIAL

## 2023-09-06 ENCOUNTER — HOSPITAL ENCOUNTER (OUTPATIENT)
Dept: ULTRASOUND IMAGING | Facility: HOSPITAL | Age: 43
Discharge: HOME | End: 2023-09-06
Payer: COMMERCIAL

## 2023-09-06 DIAGNOSIS — R92.8 ABNORMAL MAMMOGRAM: ICD-10-CM

## 2023-09-06 DIAGNOSIS — R92.0 MICROCALCIFICATION OF LEFT BREAST ON MAMMOGRAM: ICD-10-CM

## 2023-09-06 PROCEDURE — 76642 ULTRASOUND BREAST LIMITED: CPT

## 2023-09-06 PROCEDURE — G0279 TOMOSYNTHESIS, MAMMO: HCPCS

## 2023-09-06 PROCEDURE — 77066 DX MAMMO INCL CAD BI: CPT

## 2023-09-13 DIAGNOSIS — R92.8 ABNORMALITY OF LEFT BREAST ON SCREENING MAMMOGRAM: Primary | ICD-10-CM

## 2023-12-18 ENCOUNTER — TRANSCRIBE ORDERS (OUTPATIENT)
Dept: ADMINISTRATIVE | Facility: HOSPITAL | Age: 43
End: 2023-12-18
Payer: COMMERCIAL

## 2023-12-18 ENCOUNTER — LAB (OUTPATIENT)
Dept: LAB | Facility: HOSPITAL | Age: 43
End: 2023-12-18
Payer: COMMERCIAL

## 2023-12-18 DIAGNOSIS — Z00.00 ENCOUNTER FOR GENERAL ADULT MEDICAL EXAMINATION WITHOUT ABNORMAL FINDINGS: Primary | ICD-10-CM

## 2023-12-18 DIAGNOSIS — Z00.00 ENCOUNTER FOR GENERAL ADULT MEDICAL EXAMINATION WITHOUT ABNORMAL FINDINGS: ICD-10-CM

## 2023-12-18 LAB — HBA1C MFR BLD: 5.2 % (ref 4.8–5.6)

## 2023-12-18 PROCEDURE — 36415 COLL VENOUS BLD VENIPUNCTURE: CPT

## 2023-12-18 PROCEDURE — 83036 HEMOGLOBIN GLYCOSYLATED A1C: CPT

## 2024-01-09 ENCOUNTER — LAB (OUTPATIENT)
Dept: LAB | Facility: HOSPITAL | Age: 44
End: 2024-01-09
Payer: COMMERCIAL

## 2024-01-09 ENCOUNTER — TRANSCRIBE ORDERS (OUTPATIENT)
Dept: ADMINISTRATIVE | Facility: HOSPITAL | Age: 44
End: 2024-01-09
Payer: COMMERCIAL

## 2024-01-09 DIAGNOSIS — J02.9 ACUTE PHARYNGITIS, UNSPECIFIED ETIOLOGY: Primary | ICD-10-CM

## 2024-01-09 DIAGNOSIS — J02.9 ACUTE PHARYNGITIS, UNSPECIFIED ETIOLOGY: ICD-10-CM

## 2024-01-09 PROCEDURE — 0202U NFCT DS 22 TRGT SARS-COV-2: CPT

## 2024-01-24 RX ORDER — METHYLPREDNISOLONE 4 MG/1
TABLET ORAL
Qty: 21 TABLET | Refills: 0 | Status: SHIPPED | OUTPATIENT
Start: 2024-01-24

## 2024-01-30 ENCOUNTER — HOSPITAL ENCOUNTER (EMERGENCY)
Facility: HOSPITAL | Age: 44
Discharge: HOME | End: 2024-01-30
Admitting: FAMILY MEDICINE
Payer: COMMERCIAL

## 2024-01-30 ENCOUNTER — APPOINTMENT (OUTPATIENT)
Dept: CT IMAGING | Facility: HOSPITAL | Age: 44
End: 2024-01-30
Payer: COMMERCIAL

## 2024-01-30 VITALS
RESPIRATION RATE: 20 BRPM | WEIGHT: 124 LBS | TEMPERATURE: 98.1 F | BODY MASS INDEX: 22.82 KG/M2 | OXYGEN SATURATION: 100 % | DIASTOLIC BLOOD PRESSURE: 77 MMHG | HEIGHT: 62 IN | HEART RATE: 95 BPM | SYSTOLIC BLOOD PRESSURE: 116 MMHG

## 2024-01-30 DIAGNOSIS — M51.26 PROTRUSION OF LUMBAR INTERVERTEBRAL DISC: Primary | ICD-10-CM

## 2024-01-30 LAB
ALBUMIN SERPL-MCNC: 4.8 G/DL (ref 3.5–5.2)
ALBUMIN/GLOB SERPL: 1.7 G/DL
ALP SERPL-CCNC: 46 U/L (ref 39–117)
ALT SERPL W P-5'-P-CCNC: 36 U/L (ref 1–33)
ANION GAP SERPL CALCULATED.3IONS-SCNC: 13 MMOL/L (ref 5–15)
AST SERPL-CCNC: 32 U/L (ref 1–32)
B-HCG UR QL: NEGATIVE
BASOPHILS # BLD AUTO: 0.06 10*3/MM3 (ref 0–0.2)
BASOPHILS NFR BLD AUTO: 0.6 % (ref 0–1.5)
BILIRUB SERPL-MCNC: 0.6 MG/DL (ref 0–1.2)
BILIRUB UR QL STRIP: NEGATIVE
BUN SERPL-MCNC: 13 MG/DL (ref 6–20)
BUN/CREAT SERPL: 20.6 (ref 7–25)
CALCIUM SPEC-SCNC: 10.2 MG/DL (ref 8.6–10.5)
CHLORIDE SERPL-SCNC: 101 MMOL/L (ref 98–107)
CLARITY UR: CLEAR
CO2 SERPL-SCNC: 26 MMOL/L (ref 22–29)
COLOR UR: YELLOW
CREAT SERPL-MCNC: 0.63 MG/DL (ref 0.57–1)
DEPRECATED RDW RBC AUTO: 38.8 FL (ref 37–54)
EGFRCR SERPLBLD CKD-EPI 2021: 114.5 ML/MIN/1.73
EOSINOPHIL # BLD AUTO: 0.08 10*3/MM3 (ref 0–0.4)
EOSINOPHIL NFR BLD AUTO: 0.8 % (ref 0.3–6.2)
ERYTHROCYTE [DISTWIDTH] IN BLOOD BY AUTOMATED COUNT: 11.9 % (ref 12.3–15.4)
EXPIRATION DATE: NORMAL
GLOBULIN UR ELPH-MCNC: 2.9 GM/DL
GLUCOSE SERPL-MCNC: 87 MG/DL (ref 65–99)
GLUCOSE UR STRIP-MCNC: NEGATIVE MG/DL
HCT VFR BLD AUTO: 43 % (ref 34–46.6)
HGB BLD-MCNC: 14.4 G/DL (ref 12–15.9)
HGB UR QL STRIP.AUTO: NEGATIVE
IMM GRANULOCYTES # BLD AUTO: 0.04 10*3/MM3 (ref 0–0.05)
IMM GRANULOCYTES NFR BLD AUTO: 0.4 % (ref 0–0.5)
INTERNAL NEGATIVE CONTROL: NEGATIVE
INTERNAL POSITIVE CONTROL: POSITIVE
KETONES UR QL STRIP: ABNORMAL
LEUKOCYTE ESTERASE UR QL STRIP.AUTO: NEGATIVE
LYMPHOCYTES # BLD AUTO: 4.04 10*3/MM3 (ref 0.7–3.1)
LYMPHOCYTES NFR BLD AUTO: 42 % (ref 19.6–45.3)
Lab: NORMAL
MCH RBC QN AUTO: 30.3 PG (ref 26.6–33)
MCHC RBC AUTO-ENTMCNC: 33.5 G/DL (ref 31.5–35.7)
MCV RBC AUTO: 90.3 FL (ref 79–97)
MONOCYTES # BLD AUTO: 0.62 10*3/MM3 (ref 0.1–0.9)
MONOCYTES NFR BLD AUTO: 6.5 % (ref 5–12)
NEUTROPHILS NFR BLD AUTO: 4.77 10*3/MM3 (ref 1.7–7)
NEUTROPHILS NFR BLD AUTO: 49.7 % (ref 42.7–76)
NITRITE UR QL STRIP: NEGATIVE
NRBC BLD AUTO-RTO: 0 /100 WBC (ref 0–0.2)
PH UR STRIP.AUTO: 6 [PH] (ref 5–8)
PLATELET # BLD AUTO: 397 10*3/MM3 (ref 140–450)
PMV BLD AUTO: 10.1 FL (ref 6–12)
POTASSIUM SERPL-SCNC: 3.6 MMOL/L (ref 3.5–5.2)
PROT SERPL-MCNC: 7.7 G/DL (ref 6–8.5)
PROT UR QL STRIP: NEGATIVE
RBC # BLD AUTO: 4.76 10*6/MM3 (ref 3.77–5.28)
SODIUM SERPL-SCNC: 140 MMOL/L (ref 136–145)
SP GR UR STRIP: 1.01 (ref 1–1.03)
UROBILINOGEN UR QL STRIP: ABNORMAL
WBC NRBC COR # BLD AUTO: 9.61 10*3/MM3 (ref 3.4–10.8)

## 2024-01-30 PROCEDURE — 25010000002 ONDANSETRON PER 1 MG: Performed by: NURSE PRACTITIONER

## 2024-01-30 PROCEDURE — 99284 EMERGENCY DEPT VISIT MOD MDM: CPT

## 2024-01-30 PROCEDURE — 81025 URINE PREGNANCY TEST: CPT | Performed by: NURSE PRACTITIONER

## 2024-01-30 PROCEDURE — 72131 CT LUMBAR SPINE W/O DYE: CPT

## 2024-01-30 PROCEDURE — 96374 THER/PROPH/DIAG INJ IV PUSH: CPT

## 2024-01-30 PROCEDURE — 96375 TX/PRO/DX INJ NEW DRUG ADDON: CPT

## 2024-01-30 PROCEDURE — 25010000002 DEXAMETHASONE PER 1 MG: Performed by: FAMILY MEDICINE

## 2024-01-30 PROCEDURE — 80053 COMPREHEN METABOLIC PANEL: CPT | Performed by: NURSE PRACTITIONER

## 2024-01-30 PROCEDURE — 25010000002 HYDROMORPHONE PER 4 MG: Performed by: NURSE PRACTITIONER

## 2024-01-30 PROCEDURE — 81003 URINALYSIS AUTO W/O SCOPE: CPT | Performed by: NURSE PRACTITIONER

## 2024-01-30 PROCEDURE — 85025 COMPLETE CBC W/AUTO DIFF WBC: CPT | Performed by: NURSE PRACTITIONER

## 2024-01-30 PROCEDURE — 25010000002 KETOROLAC TROMETHAMINE PER 15 MG: Performed by: FAMILY MEDICINE

## 2024-01-30 RX ORDER — HYDROCODONE BITARTRATE AND ACETAMINOPHEN 5; 325 MG/1; MG/1
1 TABLET ORAL ONCE
Status: DISCONTINUED | OUTPATIENT
Start: 2024-01-30 | End: 2024-01-30

## 2024-01-30 RX ORDER — HYDROMORPHONE HYDROCHLORIDE 1 MG/ML
0.5 INJECTION, SOLUTION INTRAMUSCULAR; INTRAVENOUS; SUBCUTANEOUS ONCE
Status: COMPLETED | OUTPATIENT
Start: 2024-01-30 | End: 2024-01-30

## 2024-01-30 RX ORDER — SODIUM CHLORIDE 0.9 % (FLUSH) 0.9 %
10 SYRINGE (ML) INJECTION AS NEEDED
Status: DISCONTINUED | OUTPATIENT
Start: 2024-01-30 | End: 2024-01-31 | Stop reason: HOSPADM

## 2024-01-30 RX ORDER — DEXAMETHASONE SODIUM PHOSPHATE 10 MG/ML
10 INJECTION INTRAMUSCULAR; INTRAVENOUS ONCE
Status: COMPLETED | OUTPATIENT
Start: 2024-01-30 | End: 2024-01-30

## 2024-01-30 RX ORDER — METHYLPREDNISOLONE 4 MG/1
TABLET ORAL
Qty: 21 TABLET | Refills: 0 | Status: SHIPPED | OUTPATIENT
Start: 2024-01-30

## 2024-01-30 RX ORDER — KETOROLAC TROMETHAMINE 10 MG/1
10 TABLET, FILM COATED ORAL EVERY 6 HOURS PRN
Qty: 12 TABLET | Refills: 0 | Status: SHIPPED | OUTPATIENT
Start: 2024-01-30 | End: 2024-02-02

## 2024-01-30 RX ORDER — KETOROLAC TROMETHAMINE 15 MG/ML
15 INJECTION, SOLUTION INTRAMUSCULAR; INTRAVENOUS ONCE
Status: COMPLETED | OUTPATIENT
Start: 2024-01-30 | End: 2024-01-30

## 2024-01-30 RX ORDER — CYCLOBENZAPRINE HCL 5 MG
5 TABLET ORAL 3 TIMES DAILY PRN
Qty: 9 TABLET | Refills: 0 | Status: SHIPPED | OUTPATIENT
Start: 2024-01-30 | End: 2024-02-02

## 2024-01-30 RX ORDER — LIDOCAINE 50 MG/G
1 PATCH TOPICAL EVERY 24 HOURS
Qty: 5 PATCH | Refills: 0 | Status: SHIPPED | OUTPATIENT
Start: 2024-01-30 | End: 2024-02-04

## 2024-01-30 RX ORDER — CYCLOBENZAPRINE HCL 10 MG
10 TABLET ORAL ONCE
Status: COMPLETED | OUTPATIENT
Start: 2024-01-30 | End: 2024-01-30

## 2024-01-30 RX ORDER — LIDOCAINE 50 MG/G
1 PATCH TOPICAL
Status: DISCONTINUED | OUTPATIENT
Start: 2024-01-30 | End: 2024-01-31 | Stop reason: HOSPADM

## 2024-01-30 RX ORDER — ONDANSETRON 2 MG/ML
4 INJECTION INTRAMUSCULAR; INTRAVENOUS ONCE
Status: COMPLETED | OUTPATIENT
Start: 2024-01-30 | End: 2024-01-30

## 2024-01-30 RX ADMIN — ONDANSETRON 4 MG: 2 INJECTION INTRAMUSCULAR; INTRAVENOUS at 19:43

## 2024-01-30 RX ADMIN — DEXAMETHASONE SODIUM PHOSPHATE 10 MG: 10 INJECTION INTRAMUSCULAR; INTRAVENOUS at 20:54

## 2024-01-30 RX ADMIN — CYCLOBENZAPRINE 10 MG: 10 TABLET, FILM COATED ORAL at 20:54

## 2024-01-30 RX ADMIN — KETOROLAC TROMETHAMINE 15 MG: 15 INJECTION, SOLUTION INTRAMUSCULAR; INTRAVENOUS at 20:54

## 2024-01-30 RX ADMIN — LIDOCAINE PATCH 5% 1 PATCH: 700 PATCH TOPICAL at 20:54

## 2024-01-30 RX ADMIN — HYDROMORPHONE HYDROCHLORIDE 0.5 MG: 1 INJECTION, SOLUTION INTRAMUSCULAR; INTRAVENOUS; SUBCUTANEOUS at 19:43

## 2024-01-31 NOTE — DISCHARGE INSTRUCTIONS
Please follow-up with primary care provider soon as possible to reassess symptoms. You will likely need referral to physical therapy.  Please return the ER for any new or worsening symptoms.

## 2024-01-31 NOTE — ED PROVIDER NOTES
Subjective   History of Present Illness  Patient is a 43-year-old female who presents to the ER with chief complaints of back pain.  She states she began experiencing pain on Thursday.  She assumes she may have pulled a muscle.  She describes having pain intermittently.  She states 1 day she is experiencing quite a bit of pain and then the next day she seems to be doing somewhat better.  She states that she took leftover pain medication which did help somewhat with the pain.  She reported having difficulty with urination a few days ago that she related to pain.  She denies any dysuria or hematuria.  She states that she has found walking seems to help overall with her pain.  She states that she had COVID a few weeks ago and believes that she jerked awkwardly while sleeping which may have caused back pain.  Patient has attempted TENS unit and ice as well for her low back pain.  She states currently it almost feels locked up to where she cannot stand up straight due to the discomfort.  She complains of pain to her low back and her right flank region.  She has had no loss of bowel or bladder control or saddle anesthesia.  No previous history of kidney stones.  Past medical history significant for anxiety and depression, IBS, lactose intolerance        Review of Systems   Constitutional: Negative.  Negative for fever.   HENT: Negative.  Negative for congestion.    Respiratory: Negative.  Negative for cough.    Cardiovascular: Negative.  Negative for chest pain.   Gastrointestinal:  Positive for nausea. Negative for abdominal pain, constipation, diarrhea and vomiting.   Genitourinary:  Positive for difficulty urinating and flank pain.   Musculoskeletal:  Positive for back pain.   Skin: Negative.    All other systems reviewed and are negative.      Past Medical History:   Diagnosis Date    Anxiety and depression     Irritable bowel disease     Lactose intolerance     Seasonal allergies        Allergies   Allergen Reactions     Sulfa Antibiotics Hives     HIVES         Past Surgical History:   Procedure Laterality Date    APPENDECTOMY      AUGMENTATION MAMMAPLASTY  2015    BREAST AUGMENTATION       SECTION      COLONOSCOPY  07/10/2012    Normal exam    COLONOSCOPY N/A 06/15/2020    Tortuous colon otherwise normal exam repeat at age 52    COLONOSCOPY N/A 06/15/2023    Normal exam repeat in 7-10 years    HAND SURGERY      CUT HERSELF WITH KNIFE    LEG EXCISION LESION/CYST Bilateral 2023    Procedure: EXCISIONAL BIOPSY OF RIGHT  THIRD TOE LESION,,AND LEFT SECOND AND THIRD TOE LESION;  Surgeon: Parth Easton MD;  Location: [facility] OR;  Service: Plastics;  Laterality: Bilateral;    TONSILLECTOMY      VAGINAL BIRTH AFTER  SECTION         Family History   Problem Relation Age of Onset    Cirrhosis Mother     No Known Problems Father     No Known Problems Sister     No Known Problems Brother     Prostate cancer Maternal Grandfather     Breast cancer Maternal Aunt     Ovarian cancer Neg Hx     Uterine cancer Neg Hx     Colon cancer Neg Hx     Melanoma Neg Hx     Colon polyps Neg Hx        Social History     Socioeconomic History    Marital status:    Tobacco Use    Smoking status: Never    Smokeless tobacco: Never   Vaping Use    Vaping Use: Never used   Substance and Sexual Activity    Alcohol use: Yes     Comment: occ    Drug use: No    Sexual activity: Yes     Partners: Male     Birth control/protection: OCP           Objective   Physical Exam  Vitals and nursing note reviewed.   Constitutional:       General: She is in acute distress.      Appearance: She is well-developed.   HENT:      Head: Normocephalic and atraumatic.      Right Ear: External ear normal.      Left Ear: External ear normal.      Nose: Nose normal.      Mouth/Throat:      Pharynx: Oropharynx is clear.   Eyes:      Extraocular Movements: Extraocular movements intact.      Conjunctiva/sclera: Conjunctivae normal.   Cardiovascular:      Rate  and Rhythm: Normal rate and regular rhythm.      Pulses: Normal pulses.      Heart sounds: Normal heart sounds.   Pulmonary:      Effort: Pulmonary effort is normal.      Breath sounds: Normal breath sounds.   Abdominal:      General: Bowel sounds are normal.      Palpations: Abdomen is soft.      Tenderness: There is right CVA tenderness.   Musculoskeletal:         General: Normal range of motion.      Cervical back: Normal range of motion and neck supple.   Skin:     General: Skin is warm and dry.      Capillary Refill: Capillary refill takes less than 2 seconds.   Neurological:      General: No focal deficit present.      Mental Status: She is alert and oriented to person, place, and time.   Psychiatric:         Mood and Affect: Mood normal.         Behavior: Behavior normal.         Thought Content: Thought content normal.         Judgment: Judgment normal.         Procedures           ED Course  ED Course as of 01/31/24 0011 Tue Jan 30, 2024 2012 Work up remains pending. This will be a turn over for Jennifer CHAVEZ [TW]   2037 Care resumed from SCOTT Gómez. [KR]      ED Course User Index  [KR] Jennifer Almaguer APRN  [TW] Diamond Mayo APRN                                             Medical Decision Making  Patient is a 43-year-old female who presents to the ER with chief complaints of back pain.  She states she began experiencing pain on Thursday.  She assumes she may have pulled a muscle.  She describes having pain intermittently.  She states 1 day she is experiencing quite a bit of pain and then the next day she seems to be doing somewhat better.  She states that she took leftover pain medication which did help somewhat with the pain.  She reported having difficulty with urination a few days ago that she related to pain.  She denies any dysuria or hematuria.  She states that she has found walking seems to help overall with her pain.  She states that she had COVID a few weeks ago and  believes that she jerked awkwardly while sleeping which may have caused back pain.  Patient has attempted TENS unit and ice as well for her low back pain.  She states currently it almost feels locked up to where she cannot stand up straight due to the discomfort.  She complains of pain to her low back and her right flank region.  She has had no loss of bowel or bladder control or saddle anesthesia.  No previous history of kidney stones.  Past medical history significant for anxiety and depression, IBS, lactose intolerance  Differential diagnosis: Kidney stone, muscle strain, muscle spasm, and other    Care assumed from SCOTT Gómez.  CT scan lumbar spine reviewed by radiologist.  This revealed No acute fracture or malalignment. Disc protrusion at L1-L2 indents the ventral margin of the thecal sac.  Case discussed with Dr. Davies.  Plan to discharge the patient home and have a close follow-up with primary care provider.  I discussed the results with the patient at bedside.  She will likely need referral to physical therapy and if symptoms do not improve may need referral to neurosurgery.  Patient verbalized understanding of this and agreed with it.  Patient was advised to return the ER for any new or worsening symptoms.  Patient discharged in stable condition.    Problems Addressed:  Protrusion of lumbar intervertebral disc: complicated acute illness or injury    Amount and/or Complexity of Data Reviewed  Labs: ordered.  Radiology: ordered.    Risk  Prescription drug management.        Final diagnoses:   Protrusion of lumbar intervertebral disc       ED Disposition  ED Disposition       ED Disposition   Discharge    Condition   Stable    Comment   --               Miguel Angel Pope MD  [address] [facility] [address]  [phone]    Schedule an appointment as soon as possible for a visit in 1 day      Psychiatric EMERGENCY DEPARTMENT  [address] [place] [address]  [id number] [phone]  Go to   If symptoms worsen         Medication List        New Prescriptions      cyclobenzaprine 5 MG tablet  Commonly known as: FLEXERIL  Take 1 tablet by mouth 3 (Three) Times a Day As Needed for Muscle Spasms for up to 3 days.     ketorolac 10 MG tablet  Commonly known as: TORADOL  Take 1 tablet by mouth Every 6 (Six) Hours As Needed for Mild Pain for up to 3 days.     lidocaine 5 %  Commonly known as: LIDODERM  Place 1 patch on the skin as directed by provider Daily for 5 days. Remove & Discard patch within 12 hours or as directed by MD               Where to Get Your Medications        These medications were sent to [facility]/pharmacy #[id number] - [place], [place] - [address] AT ACROSS FROM [address] - [phone]  - [phone]   [address] [facility] [address]      Phone: [phone]   cyclobenzaprine 5 MG tablet  ketorolac 10 MG tablet  lidocaine 5 %  methylPREDNISolone 4 MG dose pack            Jennifer Almaguer, SCOTT  01/31/24 0011

## 2024-03-28 NOTE — PROGRESS NOTES
AUDIOMETRIC EVALUATION      Name:  Amanda Beltran  :  1980  Age:  43 y.o.  Date of Evaluation:  2024       History:  Reason for visit:  Ms. Beltran is seen today at the request of SCOTT Burns for a hearing evaluation. Patient was referred to ENT for dizziness and giddiness.  She does not feel she has much trouble hearing. She has not had a hearing test recently. She reports she has dizziness after laying on her right side and it lasts seconds at a time.     PE Tubes:  no, both ears   Other otologic surgical history: no, both ears  Tinnitus:  no, both ears   Dizziness:  yes  Noise Exposure: no  Aural Fullness:  no, both ears  Otalgia: no, both ears  Family history of hearing loss: yes, maternal grandmother  Other significant history:  none  Head trauma requiring hospital stay: no  Previous brain MRI: no      EVALUATION:         RESULTS:    Otoscopic Evaluation:  Bilateral: clear canal, tympanic membrane visualized         NOTE: Testing completed after ears were examined by ENT provider    Tympanometry (226 Hz):  Bilateral: Type A- normal    Pure Tone Audiometry (via inserts with good reliability):    Bilateral: hearing sensitivity is within normal limits    Speech Audiometry:   Right: Speech Reception Threshold (SRT) was obtained at 5 dBHL  Word Recognition scores-  100% (excellent/within normal limits, %)   Presented at 45dBHL with 25dB of masking in opposite ear  Using NU-6 List 4A, 25 words  Left: Speech Reception Threshold (SRT) was obtained at 10 dBHL  Word Recognition scores-  100% (excellent/within normal limits, %)   Presented at 50dBHL with 30dB of masking in opposite ear  Using NU-6 List 4A, 25 words    IMPRESSIONS:  Tympanometry showed normal middle ear pressure and static compliance, for both ears. Pure tone thresholds for both ears show hearing sensitivity is within normal limits, suggesting normal outer/middle ear function and normal cochlear/retrocochlear function. Patient  was counseled with regard to the findings.    Diagnosis:  1. Dizziness    2. Hearing within normal limits in both ears         RECOMMENDATIONS/PLAN:  Follow-up recommendations per SCOTT Burns    Return for audiologic testing if you begin to changes in hearing or concerns arise  Use hearing protection around loud noises such as lawn equipment, power tools, and firearms     EDUCATION:  Discussed results and recommendations with patient. Questions were addressed and the patient was encouraged to contact our department should concerns arise.        Liss Inman, CCC-A, F-AAA  Licensed Audiologist

## 2024-03-29 ENCOUNTER — PROCEDURE VISIT (OUTPATIENT)
Dept: OTOLARYNGOLOGY | Facility: CLINIC | Age: 44
End: 2024-03-29
Payer: COMMERCIAL

## 2024-03-29 ENCOUNTER — OFFICE VISIT (OUTPATIENT)
Dept: OTOLARYNGOLOGY | Facility: CLINIC | Age: 44
End: 2024-03-29
Payer: COMMERCIAL

## 2024-03-29 VITALS
HEIGHT: 62 IN | WEIGHT: 128 LBS | HEART RATE: 82 BPM | DIASTOLIC BLOOD PRESSURE: 92 MMHG | BODY MASS INDEX: 23.55 KG/M2 | SYSTOLIC BLOOD PRESSURE: 122 MMHG | TEMPERATURE: 98.2 F

## 2024-03-29 DIAGNOSIS — R42 DIZZINESS: Primary | ICD-10-CM

## 2024-03-29 DIAGNOSIS — R42 VERTIGO: Primary | ICD-10-CM

## 2024-03-29 DIAGNOSIS — Z01.10 HEARING WITHIN NORMAL LIMITS IN BOTH EARS: ICD-10-CM

## 2024-03-29 NOTE — PROGRESS NOTES
SCOTT Latham  [facility] EAR NOSE & THROAT  [address], [address]  [facility] [address]  Fax [phone]  Phone [phone]      Visit Type: NEW PATIENT   Chief Complaint   Patient presents with    Dizziness     Feels off balance but when she lays on her right side the room spins. She was nauseous but her PCP gave her nausea patch and it has helped           Dizziness      Amanda Beltran is a 43 y.o. female who presents for evaluation of dizzness. She has history of Vertigo in the past, improved with epley. Has not had episode in over 3-4 years. She reports sudden onset of dizziness on 3/11/24. She woke up from a nap and sat up and experienced spinning dizziness. Has been present since then.   Episodes are intermittent, last 30 seconds. Triggered by laying on right side, bending forwards and to the right, some head movement triggers but not always.   She states she has started to have generalized dizziness fairly constantly now since onset of symptoms as well, describes this as more of imbalance and worsened by the prior mentioned triggers.   Associated symptoms include nausea.   Denies lightheadedness, syncope or near syncope.   Has tried dramamine and scopolamine patch but not really helping.     Past Medical History:   Diagnosis Date    Anxiety and depression     Irritable bowel disease     Lactose intolerance     Seasonal allergies        Past Surgical History:   Procedure Laterality Date    APPENDECTOMY      AUGMENTATION MAMMAPLASTY  2015    BREAST AUGMENTATION       SECTION      COLONOSCOPY  07/10/2012    Normal exam    COLONOSCOPY N/A 06/15/2020    Tortuous colon otherwise normal exam repeat at age 52    COLONOSCOPY N/A 06/15/2023    Normal exam repeat in 7-10 years    HAND SURGERY      CUT HERSELF WITH KNIFE    LEG EXCISION LESION/CYST Bilateral 2023    Procedure: EXCISIONAL BIOPSY OF RIGHT  THIRD TOE LESION,,AND LEFT  SECOND AND THIRD TOE LESION;  Surgeon: Parth Easton MD;  Location: [facility];  Service: Plastics;  Laterality: Bilateral;    TONSILLECTOMY      VAGINAL BIRTH AFTER  SECTION         Family History: Her family history includes Breast cancer in her maternal aunt; Cirrhosis in her mother; No Known Problems in her brother, father, and sister; Prostate cancer in her maternal grandfather.     Social History: She  reports that she has never smoked. She has never used smokeless tobacco. She reports current alcohol use. She reports that she does not use drugs.    Home Medications:  DULoxetine, Loratadine, Norethin-Eth Estrad-Fe Biphas, Semaglutide-Weight Management, cholecalciferol, diazePAM, fluticasone, multivitamin with minerals, propranolol, and valACYclovir    Allergies:  She is allergic to sulfa antibiotics.       Vital Signs:   Temp:  [98.2 °F (36.8 °C)] 98.2 °F (36.8 °C)  Heart Rate:  [82] 82  BP: (122)/(92) 122/92  ENT Physical Exam  Constitutional  Appearance: patient appears well-developed, well-nourished and well-groomed,  Communication/Voice: communication appropriate for developmental age; vocal quality normal;  Head and Face  Appearance: head appears normal;  Ear  Hearing: intact;  Auricles: bilateral auricles normal;  External Mastoids: bilateral external mastoids normal;  Ear Canals: bilateral ear canals normal;  Tympanic Membranes: bilateral tympanic membranes normal;  Nose  External Nose: nares patent bilaterally; external nose normal;  Internal Nose: nasal mucosa normal; bilateral inferior turbinates normal;  Oral Cavity/Oropharynx  Lips: normal;  Teeth: normal;  Gums: gingiva normal;  Tongue: normal;  Oral mucosa: normal;  Hard palate: normal;  Soft palate: normal;  Tonsils: normal;           Result Review       RESULTS REVIEW    I have reviewed the patients old records in the chart.   The following results/records were reviewed:     Procedure visit with Elena De La Torre AUD (2024)           Assessment & Plan  Vertigo              Audio reviewed in office, relatively noraml hearing, no concernig findings on audio. She did have positive josiane benton pike, epley was performed. We will puruse basic vertigo treatment including home exercises she will start in about a week after epley. Advsied on low salt diet. We will recheck in about 8 weeks. Advised her if her dizziness worsens she may call and we can repeat Epley again if needed.     Medical and surgical options were discussed including observation, continued medical management, medication modification, and surgical management. Risks, benefits and alternatives were discussed and questions were answered. After considering the options, the patient decided to proceed with continued medical management.  Call for ear problems, especially change of hearing, ear pain or dizziness.  Protect getting water in the ears. If needed, may use over the counter silicone plugs or a cotton ball coated with vasoline when bathing.  Use hairdryer on a cool setting after bathing.  Use hearing protection in loud noise situations.  Follow a low salt diet to try to prevent inner ear fluid accumulation.  Decrease caffeine, avoid red wine, nitrites in cured meats, food additives (like monosodium glutamate/ MSG) and dyes.  EPLY MANEUVER INSTRUCTIONS: Sleep upright for the next 24 hours with the head elevated greater than 30 degrees. Refrain from bending, straining, or abrupt head movements for the next 2 weeks.     Call with questions or concerns    Return in about 2 months (around 5/29/2024) for Recheck vertigo.        Electronically signed by SCOTT Latham 03/29/24 9:53 AM CDT.

## 2024-03-29 NOTE — PATIENT INSTRUCTIONS
"VESTIBULAR EXERCISES:    Goals:  >To develop proprioceptive and visual mechanisms to compensate for a disturbance in balance function (labyrinthine system)  >To improve muscle coordination in general  T>o practice balancing under everyday conditions with special attention to using the eyes,  muscle and joint senses  >To train the movement of the eyes independent of the head  >To loosen the muscles of the neck and shoulder to overcome the protective muscular spasm and tendency to move \"in one piece\"  >To practice head movements that cause dizziness and thus gradually overcome the disability  >To encourage the restoration of self-confidence and easy spontaneous motion     Exercise Program:  A. Eye exercises (while seated in bed). Perform 5 to 10 minutes at least 5 times each day; first do slowly, then quickly. Perform 20 times each.  1.     Up and down  2.     Side to side  3.     Diagonal  4.     Focus on finger moving from 3 feet to one foot from face    B. Head exercises. To be done at first slowly with eyes open in primary position, then quickly. Later do with eyes closed. Perform 20 times each.  1.     Bending forward and backward  2.     Turning from side to side  3.     Tilting from side to side  4.     Diagonal movements    C. Coordinate the movement of head and eyes in the same direction as in B.    D. Shoulder shrugging and circling. Perform 20 times each.    E. While seated, bend forward and  objects from the ground. Perform 20 times.    F.  Standing exercises. Perform 20 times each.  1. Repeat section A  2. Change from a sitting to a standing position with the eyes open and shut.  3. Throw ball from hand to hand, above eye level             4. Throw ball from hand to hand, under knee  5. Change form from sitting to standing, turning around in between     Full activity: Perform 10 times each.  1. Walk across room with eyes open, then closed  2. Walk up and down a slope with eyes open, then closed  3. " Walk up and down steps with eyes open, then closed  4. Sit up and lie down in bed  5. Stand up and sit down in a chair  6. Recover balance when pushed in any direction  7. Throw and catch a ball  8. Any game involving stooping, straining and aiming     The following are of value in rehabilitating patients with balance problems:  1. A light cane may be used, not for walking, but to provide additional proprioceptive            orienting input.  2. While walking, the patient should not look down, but rather select a distant point for          fixation.  3. Night-lights should be left on in the patient’s home.  4. In-patients with cervical spine problems, especially in those that head turning        increase unsteadiness, the use of soft foam cervical collar limits motion and improves         stability. Exercises should be modified accordingly.  5. Mild central stimulants (Ritalin, Caffeine) are useful in alerting the patient to respond       quickly to orienting input.  6. Optimal visual correction should be achieved and maintained. If cataract surgery is           being performed, then contact lens are recommended to avoid optical distortion.  7.  Extreme fatigue and stress is to be avoided, as this may worsen the dizziness.  8.  Sedatives, vestibular suppressants and tranquilizers (Valium, Phenergan, Antivert, Dramamine, Klonopin, etc.) are to be avoided, as this may slow compensation by the brain and cause drowsiness.

## 2024-03-29 NOTE — PROGRESS NOTES
AUDIOMETRIC EVALUATION      Name:  Amanda Beltran  :  1980  Age:  43 y.o.  Date of Evaluation:  2024       History:  Ms. Beltran is seen today for a vestibular evaluation at the request of SCOTT Burns.        EVALUATION:    SEE SCANNED MEDIA    RESULTS:    Positional Screening:    Spontaneous Nystagmus Normal - No nystagmus observed or recorded   Sarai-Hallpike Negative   Left No nystagmus observed or recorded   Right Slight nystagmus recorded   Positional Normal   Supine No nystagmus observed or recorded   Head Right No nystagmus observed or recorded   Head Left No nystagmus observed or recorded       IMPRESSIONS:  Epley maneuver was performed for right benign paroxysmal positional vertigo without incidence.    Diagnosis:  1. Dizziness    2. Hearing within normal limits in both ears         RECOMMENDATIONS/PLAN:  Follow-up recommendations per SCOTT Burns.  Follow-up if vestibular symptoms persist  Discussed results and recommendations with patient. Questions were addressed and the patient was encouraged to contact our department should concerns arise.        Kailee Corey, CCC-A, F-AAA  Doctor of Audiology

## 2024-04-17 ENCOUNTER — OFFICE VISIT (OUTPATIENT)
Dept: OBSTETRICS AND GYNECOLOGY | Age: 44
End: 2024-04-17
Payer: COMMERCIAL

## 2024-04-17 VITALS
DIASTOLIC BLOOD PRESSURE: 80 MMHG | HEIGHT: 62 IN | WEIGHT: 122 LBS | BODY MASS INDEX: 22.45 KG/M2 | SYSTOLIC BLOOD PRESSURE: 132 MMHG

## 2024-04-17 DIAGNOSIS — L85.3 DRY SKIN: ICD-10-CM

## 2024-04-17 DIAGNOSIS — N92.6 IRREGULAR MENSES: ICD-10-CM

## 2024-04-17 DIAGNOSIS — R53.82 CHRONIC FATIGUE: Primary | ICD-10-CM

## 2024-04-17 DIAGNOSIS — N95.1 PERI-MENOPAUSE: ICD-10-CM

## 2024-04-17 RX ORDER — SCOLOPAMINE TRANSDERMAL SYSTEM 1 MG/1
PATCH, EXTENDED RELEASE TRANSDERMAL
COMMUNITY
Start: 2024-03-28

## 2024-04-17 RX ORDER — CYCLOBENZAPRINE HCL 10 MG
1 TABLET ORAL NIGHTLY
COMMUNITY
End: 2024-04-17

## 2024-04-17 RX ORDER — NORETHINDRONE ACETATE AND ETHINYL ESTRADIOL .02; 1 MG/1; MG/1
1 TABLET ORAL DAILY
Qty: 21 TABLET | Refills: 12 | Status: SHIPPED | OUTPATIENT
Start: 2024-04-17 | End: 2025-04-17

## 2024-04-17 RX ORDER — LIDOCAINE 50 MG/G
PATCH TOPICAL
COMMUNITY
End: 2024-04-17

## 2024-04-17 RX ORDER — KETOROLAC TROMETHAMINE 10 MG/1
TABLET, FILM COATED ORAL
COMMUNITY
End: 2024-04-17

## 2024-04-17 RX ORDER — ONDANSETRON 4 MG/1
TABLET, ORALLY DISINTEGRATING ORAL
COMMUNITY

## 2024-04-17 NOTE — PROGRESS NOTES
Chief Complaint   Patient presents with    Menopause     Patient is here to follow up on HRT - Lo Loestrin FE she has taken for DUB, but is not well controlled. She reports being in leobardo-menopause - fatigue, night sweats, itchy skin, irregular bleeding, mood swings, anxiety and depression have worsened, and insomnia.        History:  Amanda Beltran is a 43 y.o. female who presents today for follow-up for evaluation of the above:    HPI    Patient presents today with c/o fatigue, night sweats, skin changes, irregular menstrual bleeding, mood swings, sleep disturbance, and worsening anxiety and depression.  Her last office visit in the office was with Kim on 08/14/2023 for an annual and was started on OCP for DUB.   She reports she is still taking Lo Loestin.   Has tried some OTC supplements to help with leobardo menopausal symptoms.         ROS:  Review of Systems   Constitutional:  Positive for fatigue.   Endocrine: Positive for heat intolerance.   Genitourinary:  Positive for menstrual problem.   Skin:  Positive for dry skin.   Psychiatric/Behavioral:  Positive for sleep disturbance and depressed mood. The patient is nervous/anxious.        Ms. Beltran  reports that she has never smoked. She has never used smokeless tobacco. She reports current alcohol use of about 2.0 standard drinks of alcohol per week. She reports that she does not use drugs.      Current Outpatient Medications:     cholecalciferol (VITAMIN D3) 1000 UNITS tablet, Take 3 tablets by mouth Daily., Disp: , Rfl:     diazePAM (VALIUM) 2 MG tablet, Take 1 tablet by mouth Every 12 (Twelve) Hours As Needed for Anxiety., Disp: , Rfl:     DULoxetine (CYMBALTA) 60 MG capsule, Take 1 capsule by mouth Daily., Disp: , Rfl:     fluticasone (FLONASE) 50 MCG/ACT nasal spray, Flonase Allergy Relief 50 mcg/actuation nasal spray,suspension  Inhale 2 sprays every day by intranasal route as needed., Disp: , Rfl:     Loratadine 10 MG capsule, Take 1 capsule by mouth  "Daily., Disp: , Rfl:     Multiple Vitamins-Minerals (MULTIVITAMIN WITH MINERALS) tablet tablet, Take 1 tablet by mouth Daily., Disp: , Rfl:     ondansetron ODT (ZOFRAN-ODT) 4 MG disintegrating tablet, TAKE ONE TABLET BY MOUTH EVERY 6-8HRS AS NEEDED, Disp: , Rfl:     propranolol (INDERAL) 20 MG tablet, propranolol 20 mg tablet  TAKE 1 TABLET TWICE A DAY BY ORAL ROUTE AS NEEDED., Disp: , Rfl:     Scopolamine 1 MG/3DAYS patch, Apply 1mg patch every 3 days as needed for dizziness, Disp: , Rfl:     Semaglutide-Weight Management (Wegovy) 1 MG/0.5ML solution auto-injector, Inject 1 mg under the skin into the appropriate area as directed 1 (One) Time Per Week., Disp: 2 mL, Rfl: 1    valACYclovir (VALTREX) 1000 MG tablet, Take 1 tablet by mouth 2 (Two) Times a Day As Needed (cold sore)., Disp: , Rfl:     norethindrone-ethinyl estradiol (Loestrin 1/20, 21,) 1-20 MG-MCG per tablet, Take 1 tablet by mouth Daily., Disp: 21 tablet, Rfl: 12      OBJECTIVE:  /80   Ht 157.5 cm (62\")   Wt 55.3 kg (122 lb)   LMP 04/10/2024 (Exact Date)   BMI 22.31 kg/m²    Physical Exam  Constitutional:       Appearance: She is not ill-appearing.   Pulmonary:      Effort: No respiratory distress.   Neurological:      Mental Status: She is oriented to person, place, and time.   Psychiatric:         Behavior: Behavior normal.         Assessment/Plan    Diagnoses and all orders for this visit:    1. Chronic fatigue (Primary)  -     norethindrone-ethinyl estradiol (Loestrin 1/20, 21,) 1-20 MG-MCG per tablet; Take 1 tablet by mouth Daily.  Dispense: 21 tablet; Refill: 12    2. Dry skin  -     norethindrone-ethinyl estradiol (Loestrin 1/20, 21,) 1-20 MG-MCG per tablet; Take 1 tablet by mouth Daily.  Dispense: 21 tablet; Refill: 12    3. Irregular menses  -     norethindrone-ethinyl estradiol (Loestrin 1/20, 21,) 1-20 MG-MCG per tablet; Take 1 tablet by mouth Daily.  Dispense: 21 tablet; Refill: 12    4. Jazlyn-menopause    She is currently on Lo " Loestin. We will change to Loestrin and see if increasing her estrogen dose helps with her leobardo-menopausal symptoms.        An After Visit Summary was printed and given to the patient at discharge.  Return if symptoms worsen or fail to improve, for Next scheduled follow up. Sooner if problems arise.          Amber CHAVEZ. 4/17/2024   Electronically Signed

## 2024-05-29 ENCOUNTER — OFFICE VISIT (OUTPATIENT)
Dept: OTOLARYNGOLOGY | Facility: CLINIC | Age: 44
End: 2024-05-29
Payer: COMMERCIAL

## 2024-05-29 VITALS
WEIGHT: 124 LBS | HEIGHT: 62 IN | BODY MASS INDEX: 22.82 KG/M2 | DIASTOLIC BLOOD PRESSURE: 97 MMHG | HEART RATE: 68 BPM | TEMPERATURE: 97.8 F | SYSTOLIC BLOOD PRESSURE: 147 MMHG

## 2024-05-29 DIAGNOSIS — H81.11 BENIGN PAROXYSMAL POSITIONAL VERTIGO OF RIGHT EAR: ICD-10-CM

## 2024-05-29 DIAGNOSIS — R42 VERTIGO: Primary | ICD-10-CM

## 2024-05-29 NOTE — PROGRESS NOTES
"    SCOTT Latham  [facility] EAR NOSE & THROAT  [address], [address]  [facility] [address]  Fax [phone]  Phone [phone]      Visit Type: FOLLOW UP   Chief Complaint   Patient presents with    Dizziness     Tmj possible           Dizziness      Amanda Beltran is a 43 y.o. female who presents for follow up, recheck vertigo.  She has history of vertigo episodes in the past. Had not had episode in 3-4 years until a few months ago, started having fairly persistent vertigo, we saw her and an Epley was performed to the right. She reports since that visit she has had some decent improvement, still admits \"mild\" or tolerable vertigo when she lays to her right or rolling to her right in bed. States the episodes now only last for seconds to mins, resolved with staying still.     She also reports history of TMJ issues, chronically clench teeth. Has tried Bite guards from dentist in the past but did not seem to help, chewed through them. She reports occasional TMJ pain but frequent or constant pressure and tenderness in jaw line. Also reports that she has had left side of her jaw \"lock up\" twice.     Past Medical History:   Diagnosis Date    Anxiety and depression     BPPV (benign paroxysmal positional vertigo)     Irritable bowel disease     Lactose intolerance     PMS (premenstrual syndrome)     PONV (postoperative nausea and vomiting)     Seasonal allergies     Urinary tract infection        Past Surgical History:   Procedure Laterality Date    APPENDECTOMY      AUGMENTATION MAMMAPLASTY  2015    BREAST AUGMENTATION       SECTION      COLONOSCOPY  07/10/2012    Normal exam    COLONOSCOPY N/A 06/15/2020    Tortuous colon otherwise normal exam repeat at age 52    COLONOSCOPY N/A 06/15/2023    Normal exam repeat in 7-10 years    HAND SURGERY      CUT HERSELF WITH KNIFE    LEG EXCISION LESION/CYST Bilateral 2023    Procedure: EXCISIONAL " BIOPSY OF RIGHT  THIRD TOE LESION,,AND LEFT SECOND AND THIRD TOE LESION;  Surgeon: Parth Easton MD;  Location: [facility];  Service: Plastics;  Laterality: Bilateral;    TONSILLECTOMY      VAGINAL BIRTH AFTER  SECTION      WISDOM TOOTH EXTRACTION         Family History: Her family history includes Breast cancer in her maternal aunt; Cirrhosis in her mother; Coronary artery disease in her maternal grandmother; Diabetes in her paternal grandmother; No Known Problems in her brother, father, and sister; Prostate cancer in her maternal grandfather.     Social History: She  reports that she has never smoked. She has never used smokeless tobacco. She reports current alcohol use of about 2.0 standard drinks of alcohol per week. She reports that she does not use drugs.    Home Medications:  DULoxetine, Loratadine, Norethindrone Acet-Ethinyl Est, Scopolamine, Semaglutide-Weight Management, cholecalciferol, diazePAM, fluticasone, multivitamin with minerals, norethindrone-ethinyl estradiol, ondansetron ODT, propranolol, and valACYclovir    Allergies:  She is allergic to sulfa antibiotics.       Vital Signs:   Temp:  [97.8 °F (36.6 °C)] 97.8 °F (36.6 °C)  Heart Rate:  [68] 68  BP: (147)/(97) 147/97  ENT Physical Exam  Constitutional  Appearance: patient appears well-developed, well-nourished and well-groomed,  Communication/Voice: communication appropriate for developmental age; vocal quality normal;  Head and Face  Appearance: head appears normal, face appears normal and face appears atraumatic;  Palpation: masseter muscle tender bilaterally;  Ear  Hearing: intact;  Auricles: bilateral auricles normal;  External Mastoids: bilateral external mastoids normal;  Ear Canals: bilateral ear canals normal;  Tympanic Membranes: bilateral tympanic membranes normal;  Nose  External Nose: nares patent bilaterally; external nose normal;  Internal Nose: nasal mucosa normal; bilateral inferior turbinates normal;  Oral  Cavity/Oropharynx  Lips: normal;  Teeth: normal;  Gums: gingiva normal;  Tongue: normal;  Oral mucosa: normal;  Hard palate: normal;  Soft palate: normal;  Tonsils: normal;  Neck  Neck: neck normal; neck palpation normal;  Respiratory  Inspection: breathing unlabored; normal breathing rate;  Cardiovascular  Inspection: extremities are warm and well perfused;  Neurovestibular  Mental Status: alert and oriented;           Result Review       RESULTS REVIEW    I have reviewed the patients old records in the chart.   The following results/records were reviewed:     Procedure visit with Elena De La Torre AUD (03/29/2024)          Assessment & Plan  Vertigo    Benign paroxysmal positional vertigo, unspecified laterality              Medical and surgical options were discussed including observation, continued medical management, medication modification, and surgical management. Risks, benefits and alternatives were discussed and questions were answered. After considering the options, the patient decided to proceed with continued medical management.  Call for ear problems, especially change of hearing, ear pain or dizziness.  For the best results, use nasal sprays every day. It may take a week to build up in the nasal lining and a few more weeks to improve the eustachian tube function.  Protect getting water in the ears. If needed, may use over the counter silicone plugs or a cotton ball coated with vasoline when bathing.  Use hairdryer on a cool setting after bathing.  Follow a low salt diet to try to prevent inner ear fluid accumulation.  Decrease caffeine, avoid red wine, nitrites in cured meats, food additives (like monosodium glutamate/ MSG) and dyes.  Will schedule for a repeat Epley maneuver to right, will call with appointment time.   Continue home vestibular exercises and intervention, call if not improving as expected or if more notable vertigo returns.     Discussed TMJ intervention, she is agreeable to trial  home interventions, she is interested in Botox injection but we do not perform at our office, we discussed consideration of steroid TJM injection with Dr. Garcia but she wishes to try conservative measures again and will consider injections. She will call if new symptoms develop or jaw locks up.       Return in about 3 months (around 8/29/2024) for Recheck Vertigo.        Electronically signed by SCOTT Latham 05/29/24 11:14 AM CDT.

## 2024-05-29 NOTE — PATIENT INSTRUCTIONS
>NASAL STEROID use:  Using nasal steroids:  You will be prescribed one of the following nasal steroids: Flonase, Nasacort, Nasonex, Rhinocort, Qnasl, Zetonna  2 puffs each nostril 2 times daily  Start as soon as possible  If you are using Afrin for 3 days with the nasal steroid,  Use Afrin first and wait 10 minutes to allow the nose to open. Then administer nasal steroids.   >NASAL SALINE:  Use 2 puffs each nostril 4-6 times daily and more frequently if possible.  You can buy saline spray or you can make your own and use an old spray bottle to administer  Use a humidifier at bedside  Recipe for saline:  Water                                 1 quart  Salt (table)                        1 tablespoon  Gylcerin (or Destinee Syrup)    1 teaspoon  Sodium bicarbonate           1 teaspoon  Sprays or Shanae pots are recommended    Do not allow to stand for more than 24 hrs. Make new solution. There is no preservative in this solution.    Sinus irrigation and saline application can be enhanced with various over-the-counter products.  A WaterPik can be quite useful to irrigate, especially following sinus surgery.  Navage makes a product that irrigates the nose and some of the sinuses.  NeilMed makes a Sinu-Gator to irrigate the sinuses.  Neomed also has canned saline that we will come out under pressure.  A Shanae pot can also be helpful.  All of these products help keep the nose clear of debris.  Please use as directed on the instructions that come with the particular device.   How to pop ears:  Pop your ears by holding nose and closing mouth, then blow hard until ears pop  Children (less than 8-9 years)- blow up ballons 4 times a day and more frequently     VESTIBULAR EXERCISES:    Goals:  >To develop proprioceptive and visual mechanisms to compensate for a disturbance in balance function (labyrinthine system)  >To improve muscle coordination in general  T>o practice balancing under everyday conditions with special attention to  "using the eyes,  muscle and joint senses  >To train the movement of the eyes independent of the head  >To loosen the muscles of the neck and shoulder to overcome the protective muscular spasm and tendency to move \"in one piece\"  >To practice head movements that cause dizziness and thus gradually overcome the disability  >To encourage the restoration of self-confidence and easy spontaneous motion     Exercise Program:  A. Eye exercises (while seated in bed). Perform 5 to 10 minutes at least 5 times each day; first do slowly, then quickly. Perform 20 times each.  1.     Up and down  2.     Side to side  3.     Diagonal  4.     Focus on finger moving from 3 feet to one foot from face    B. Head exercises. To be done at first slowly with eyes open in primary position, then quickly. Later do with eyes closed. Perform 20 times each.  1.     Bending forward and backward  2.     Turning from side to side  3.     Tilting from side to side  4.     Diagonal movements    C. Coordinate the movement of head and eyes in the same direction as in B.    D. Shoulder shrugging and circling. Perform 20 times each.    E. While seated, bend forward and  objects from the ground. Perform 20 times.    F.  Standing exercises. Perform 20 times each.  1. Repeat section A  2. Change from a sitting to a standing position with the eyes open and shut.  3. Throw ball from hand to hand, above eye level             4. Throw ball from hand to hand, under knee  5. Change form from sitting to standing, turning around in between     Full activity: Perform 10 times each.  1. Walk across room with eyes open, then closed  2. Walk up and down a slope with eyes open, then closed  3. Walk up and down steps with eyes open, then closed  4. Sit up and lie down in bed  5. Stand up and sit down in a chair  6. Recover balance when pushed in any direction  7. Throw and catch a ball  8. Any game involving stooping, straining and aiming     The following are of " value in rehabilitating patients with balance problems:  1. A light cane may be used, not for walking, but to provide additional proprioceptive            orienting input.  2. While walking, the patient should not look down, but rather select a distant point for          fixation.  3. Night-lights should be left on in the patient’s home.  4. In-patients with cervical spine problems, especially in those that head turning        increase unsteadiness, the use of soft foam cervical collar limits motion and improves         stability. Exercises should be modified accordingly.  5. Mild central stimulants (Ritalin, Caffeine) are useful in alerting the patient to respond       quickly to orienting input.  6. Optimal visual correction should be achieved and maintained. If cataract surgery is           being performed, then contact lens are recommended to avoid optical distortion.  7.  Extreme fatigue and stress is to be avoided, as this may worsen the dizziness.  8.  Sedatives, vestibular suppressants and tranquilizers (Valium, Phenergan, Antivert, Dramamine, Klonopin, etc.) are to be avoided, as this may slow compensation by the brain and cause drowsiness.     TEMPOROMANDIBULAR JOINT EXERCISES     The temporomandibular joint, or the TMJ, is the jaw joint. This joint can frequently be a source of pain in the head and neck region. Typically because of its location, pain is felt either in area just in front of the ear, or in the ear itself. However, pain in the TMJ can be referred to any part of the head and neck.     An examination by the physician frequently reveals tenderness around the joint. Oftentimes, a crack or pop can also be felt. This may be an indication that the pain is in all actuality coming from the joint. An exhaustive search for a cause is carried out, in an effort to determine the etiology of the pain. Pain can be caused by grinding of teeth at night (bruxism), overuse (gum chewing, ice cracking, over  opening mouth), poor dentition and malocclusion (bad bite). In an attempt to be thorough, your physician may involve others who have experience in this field, such as oral surgeons, dentists and pain experts.     Should you be diagnosed with TMJ pain, a course of conservative treatment is indicated, as this works in an overwhelming majority of cases. Because this pain originates from a joint, the treatment is similar to treatment for pain in other joints.  Treatment     Rest:  This is indicated to relieve the pressure on the joint. No chewing gum, tough meat, hard bread, cracking ice in the teeth, or any other activity that places undue stress on the joint. Simply, if it causes it to hurt, stop doing it. This may be required for an unspecified period of time, usually 1 to 2 weeks.     Cold to the area:  This will reduce swelling and pain early in the course.     Heat to the area:  This improves blood flow to the area and speeds healing.     Pain Relievers:  Anti-inflammatory medications, such as aspirin, Motrin, Advil, Nuprin, Aleve or any other products in this category are satisfactory to use in the face of joint pain. Rarely are narcotics required, except possibly in the acute phase to gain some initial relief.  Dr. Garcia may administer pain blocks to relieve severe pain and spasm. Nerve blocks may also be used.    Recommendations:  ?       Reduce/eliminate caffeinated drinks  ?       Reduce high sugar drinks and foods  ?       Get plenty of rest  ?       Practice relaxation techniques; Yoga, Biofeedback, Max Chi, etc.  ?       Exercise for overall fitness  ?       Eat healthy- High fiber, low fat/cholesterol eating, and change eating habits. We recommend Sugar Busters as a lifestyle change for eating.  ?       See your dentist regularly for checkups and bite checks.  Rehabilitation:  Once the acute pain has been controlled, you should begin exercises to strengthen and rehabilitate the TMJ.     Exercises:  These should be performed for five minutes at least five times each day     Slowly open the mouth to its fullest extent, even using the fingers to exert gentle pressure.     Open and close the mouth as widely and rapidly as possible.     Open and close the mouth against resistance by placing the open palm underneath the chin, or the fingers on top of the chin.     Move the lower jaw side to side without resistance. Later, add resistance by placing both hands on either side of the jaw.     Push (protrude) the lower jaw without resistance. Later add resistance.     Should the above regimen fail to lead to improvement, your physician will discuss with you other forms of therapy. Since almost all types of TMJ pain respond to conservative therapy, surgery is indicated only after exhausting the rehabilitation. Dr. Garcia does not perform rehabilitative surgery on the temporomandibular joint, but refers patients to an oral surgery who specialize in this type of surgery.

## 2024-06-05 ENCOUNTER — LAB (OUTPATIENT)
Dept: LAB | Facility: HOSPITAL | Age: 44
End: 2024-06-05
Payer: COMMERCIAL

## 2024-06-05 ENCOUNTER — TRANSCRIBE ORDERS (OUTPATIENT)
Dept: ADMINISTRATIVE | Facility: HOSPITAL | Age: 44
End: 2024-06-05
Payer: COMMERCIAL

## 2024-06-05 DIAGNOSIS — E66.9 OBESITY, UNSPECIFIED CLASSIFICATION, UNSPECIFIED OBESITY TYPE, UNSPECIFIED WHETHER SERIOUS COMORBIDITY PRESENT: Primary | ICD-10-CM

## 2024-06-05 DIAGNOSIS — E03.8 OTHER SPECIFIED HYPOTHYROIDISM: ICD-10-CM

## 2024-06-05 DIAGNOSIS — E66.9 OBESITY, UNSPECIFIED CLASSIFICATION, UNSPECIFIED OBESITY TYPE, UNSPECIFIED WHETHER SERIOUS COMORBIDITY PRESENT: ICD-10-CM

## 2024-06-05 LAB
ALBUMIN SERPL-MCNC: 4.4 G/DL (ref 3.5–5.2)
ALBUMIN/GLOB SERPL: 1.9 G/DL
ALP SERPL-CCNC: 36 U/L (ref 39–117)
ALT SERPL W P-5'-P-CCNC: 7 U/L (ref 1–33)
ANION GAP SERPL CALCULATED.3IONS-SCNC: 5 MMOL/L (ref 5–15)
AST SERPL-CCNC: 17 U/L (ref 1–32)
BASOPHILS # BLD AUTO: 0.04 10*3/MM3 (ref 0–0.2)
BASOPHILS NFR BLD AUTO: 0.6 % (ref 0–1.5)
BILIRUB SERPL-MCNC: 0.3 MG/DL (ref 0–1.2)
BUN SERPL-MCNC: 12 MG/DL (ref 6–20)
BUN/CREAT SERPL: 20 (ref 7–25)
CALCIUM SPEC-SCNC: 8.9 MG/DL (ref 8.6–10.5)
CHLORIDE SERPL-SCNC: 104 MMOL/L (ref 98–107)
CHOLEST SERPL-MCNC: 157 MG/DL (ref 0–200)
CO2 SERPL-SCNC: 27 MMOL/L (ref 22–29)
CREAT SERPL-MCNC: 0.6 MG/DL (ref 0.57–1)
DEPRECATED RDW RBC AUTO: 40.2 FL (ref 37–54)
EGFRCR SERPLBLD CKD-EPI 2021: 115.8 ML/MIN/1.73
EOSINOPHIL # BLD AUTO: 0.03 10*3/MM3 (ref 0–0.4)
EOSINOPHIL NFR BLD AUTO: 0.5 % (ref 0.3–6.2)
ERYTHROCYTE [DISTWIDTH] IN BLOOD BY AUTOMATED COUNT: 12 % (ref 12.3–15.4)
GLOBULIN UR ELPH-MCNC: 2.3 GM/DL
GLUCOSE SERPL-MCNC: 87 MG/DL (ref 65–99)
HBA1C MFR BLD: 4.8 % (ref 4.8–5.6)
HCT VFR BLD AUTO: 39.7 % (ref 34–46.6)
HDLC SERPL-MCNC: 61 MG/DL (ref 40–60)
HGB BLD-MCNC: 13.4 G/DL (ref 12–15.9)
IMM GRANULOCYTES # BLD AUTO: 0.02 10*3/MM3 (ref 0–0.05)
IMM GRANULOCYTES NFR BLD AUTO: 0.3 % (ref 0–0.5)
LDLC SERPL CALC-MCNC: 84 MG/DL (ref 0–100)
LDLC/HDLC SERPL: 1.38 {RATIO}
LYMPHOCYTES # BLD AUTO: 2.21 10*3/MM3 (ref 0.7–3.1)
LYMPHOCYTES NFR BLD AUTO: 34.1 % (ref 19.6–45.3)
MCH RBC QN AUTO: 30.7 PG (ref 26.6–33)
MCHC RBC AUTO-ENTMCNC: 33.8 G/DL (ref 31.5–35.7)
MCV RBC AUTO: 90.8 FL (ref 79–97)
MONOCYTES # BLD AUTO: 0.41 10*3/MM3 (ref 0.1–0.9)
MONOCYTES NFR BLD AUTO: 6.3 % (ref 5–12)
NEUTROPHILS NFR BLD AUTO: 3.78 10*3/MM3 (ref 1.7–7)
NEUTROPHILS NFR BLD AUTO: 58.2 % (ref 42.7–76)
NRBC BLD AUTO-RTO: 0 /100 WBC (ref 0–0.2)
PLATELET # BLD AUTO: 296 10*3/MM3 (ref 140–450)
PMV BLD AUTO: 10.7 FL (ref 6–12)
POTASSIUM SERPL-SCNC: 4.4 MMOL/L (ref 3.5–5.2)
PROT SERPL-MCNC: 6.7 G/DL (ref 6–8.5)
RBC # BLD AUTO: 4.37 10*6/MM3 (ref 3.77–5.28)
SODIUM SERPL-SCNC: 136 MMOL/L (ref 136–145)
TRIGL SERPL-MCNC: 59 MG/DL (ref 0–150)
TSH SERPL DL<=0.05 MIU/L-ACNC: 0.63 UIU/ML (ref 0.27–4.2)
VLDLC SERPL-MCNC: 12 MG/DL (ref 5–40)
WBC NRBC COR # BLD AUTO: 6.49 10*3/MM3 (ref 3.4–10.8)

## 2024-06-05 PROCEDURE — 80050 GENERAL HEALTH PANEL: CPT

## 2024-06-05 PROCEDURE — 83036 HEMOGLOBIN GLYCOSYLATED A1C: CPT

## 2024-06-05 PROCEDURE — 36415 COLL VENOUS BLD VENIPUNCTURE: CPT

## 2024-06-05 PROCEDURE — 80061 LIPID PANEL: CPT

## 2024-08-22 ENCOUNTER — PATIENT MESSAGE (OUTPATIENT)
Dept: OBSTETRICS AND GYNECOLOGY | Age: 44
End: 2024-08-22

## 2024-08-22 ENCOUNTER — OFFICE VISIT (OUTPATIENT)
Dept: OBSTETRICS AND GYNECOLOGY | Age: 44
End: 2024-08-22
Payer: COMMERCIAL

## 2024-08-22 VITALS
HEIGHT: 62 IN | WEIGHT: 122 LBS | SYSTOLIC BLOOD PRESSURE: 116 MMHG | BODY MASS INDEX: 22.45 KG/M2 | DIASTOLIC BLOOD PRESSURE: 84 MMHG

## 2024-08-22 DIAGNOSIS — N95.1 MENOPAUSAL SYMPTOMS: ICD-10-CM

## 2024-08-22 DIAGNOSIS — Z01.419 WELL WOMAN EXAM WITH ROUTINE GYNECOLOGICAL EXAM: Primary | ICD-10-CM

## 2024-08-22 DIAGNOSIS — Z30.014 ENCOUNTER FOR INITIAL PRESCRIPTION OF INTRAUTERINE CONTRACEPTIVE DEVICE: ICD-10-CM

## 2024-08-22 DIAGNOSIS — Z12.4 ENCOUNTER FOR SCREENING FOR CERVICAL CANCER: ICD-10-CM

## 2024-08-22 PROCEDURE — 87481 CANDIDA DNA AMP PROBE: CPT

## 2024-08-22 PROCEDURE — 87661 TRICHOMONAS VAGINALIS AMPLIF: CPT

## 2024-08-22 PROCEDURE — 87624 HPV HI-RISK TYP POOLED RSLT: CPT

## 2024-08-22 PROCEDURE — 87563 M. GENITALIUM AMP PROBE: CPT

## 2024-08-22 PROCEDURE — 87798 DETECT AGENT NOS DNA AMP: CPT

## 2024-08-22 PROCEDURE — 87512 GARDNER VAG DNA QUANT: CPT

## 2024-08-22 PROCEDURE — G0123 SCREEN CERV/VAG THIN LAYER: HCPCS

## 2024-08-22 NOTE — PROGRESS NOTES
"Yvette Beltran is a 44 y.o. female    History of Present Illness  The patient presents to [facility] today for her well woman examination. The patient has noticed symptoms that she finds consistent with a yeast infection and would like BV testing with her pap smear today. She is using an oral contraceptive and doing well with this, however, she is experiencing increasing menopausal symptoms of hot flashes, night sweats, mood swings, and muscle cramping and would like to discuss options today for treatment.   Gynecologic Exam  The patient's primary symptoms include vaginal discharge. The patient's pertinent negatives include no genital itching, genital lesions, genital odor, genital rash, missed menses, pelvic pain or vaginal bleeding. This is a new problem. The current episode started 1 to 4 weeks ago. The problem occurs daily. The problem has been comes and goes. The problem affects the vaginal only side. Pertinent negatives include no abdominal pain, anorexia, back pain, chills, constipation, diarrhea, discolored urine, dysuria, fever, flank pain, frequency, headaches, hematuria, joint pain, joint swelling, nausea, painful intercourse, rash, sore throat, urgency or vomiting. The vaginal discharge was white and thick. There has been no bleeding. She has not been passing clots. She has not been passing tissue. Nothing aggravates the symptoms. She has tried nothing for the symptoms. She is sexually active. No, her partner does not have an STD. She uses oral contraceptives for contraception. Her menstrual history has been regular. The maximum temperature recorded prior to her arrival was no fever.         /84   Ht 157.5 cm (62.01\")   Wt 55.3 kg (122 lb)   LMP 02/26/2024 (Exact Date)   BMI 22.31 kg/m²     Outpatient Encounter Medications as of 8/22/2024   Medication Sig Dispense Refill    cholecalciferol (VITAMIN D3) 1000 UNITS tablet Take 3 tablets by mouth Daily.      diazePAM (VALIUM) 2 MG " tablet Take 1 tablet by mouth Every 12 (Twelve) Hours As Needed for Anxiety.      DULoxetine (CYMBALTA) 60 MG capsule Take 1 capsule by mouth Daily.      fluticasone (FLONASE) 50 MCG/ACT nasal spray Flonase Allergy Relief 50 mcg/actuation nasal spray,suspension   Inhale 2 sprays every day by intranasal route as needed.      Loratadine 10 MG capsule Take 1 capsule by mouth Daily.      Multiple Vitamins-Minerals (MULTIVITAMIN WITH MINERALS) tablet tablet Take 1 tablet by mouth Daily.      Norethindrone Acet-Ethinyl Est (JUNEL 1/20 PO) Take  by mouth.      ondansetron ODT (ZOFRAN-ODT) 4 MG disintegrating tablet TAKE ONE TABLET BY MOUTH EVERY 6-8HRS AS NEEDED      propranolol (INDERAL) 20 MG tablet propranolol 20 mg tablet   TAKE 1 TABLET TWICE A DAY BY ORAL ROUTE AS NEEDED.      Scopolamine 1 MG/3DAYS patch Apply 1mg patch every 3 days as needed for dizziness      Semaglutide-Weight Management (Wegovy) 1 MG/0.5ML solution auto-injector Inject 0.5 mL under the skin into the appropriate area as directed 1 (One) Time Per Week. 2 mL 1    valACYclovir (VALTREX) 1000 MG tablet Take 1 tablet by mouth 2 (Two) Times a Day As Needed (cold sore).      Fezolinetant (VEOZAH) 45 MG tablet Take 1 tablet by mouth Daily for 90 days. 30 tablet 0    Levonorgestrel (MIRENA) 20 MCG/DAY intrauterine device IUD To be inserted one time by prescriber. Route intrauterine. 1 each 0    [DISCONTINUED] norethindrone-ethinyl estradiol (Loestrin 1/20, 21,) 1-20 MG-MCG per tablet Take 1 tablet by mouth Daily. 21 tablet 12    [DISCONTINUED] valACYclovir (VALTREX) 1000 MG tablet Take 1 tablet by mouth Every 12 (Twelve) Hours for 7 days. 14 tablet 2     No facility-administered encounter medications on file as of 8/22/2024.       Past Medical History  Past Medical History:   Diagnosis Date    Anxiety and depression     BPPV (benign paroxysmal positional vertigo)     Irritable bowel disease     Lactose intolerance     PMS (premenstrual syndrome)     PONV  (postoperative nausea and vomiting)     Seasonal allergies     Urinary tract infection        Surgical History  Past Surgical History:   Procedure Laterality Date    APPENDECTOMY      AUGMENTATION MAMMAPLASTY  2015     SECTION      COLONOSCOPY  07/10/2012    Normal exam    COLONOSCOPY N/A 06/15/2020    Tortuous colon otherwise normal exam repeat at age 52    COLONOSCOPY N/A 06/15/2023    Normal exam repeat in 7-10 years    HAND SURGERY      CUT HERSELF WITH KNIFE    LEG EXCISION LESION/CYST Bilateral 2023    Procedure: EXCISIONAL BIOPSY OF RIGHT  THIRD TOE LESION,,AND LEFT SECOND AND THIRD TOE LESION;  Surgeon: Parth Easton MD;  Location: [facility] OR;  Service: Plastics;  Laterality: Bilateral;    TONSILLECTOMY      WISDOM TOOTH EXTRACTION         Family History  Family History   Problem Relation Age of Onset    Cirrhosis Mother     No Known Problems Father     No Known Problems Sister     No Known Problems Brother     Coronary artery disease Maternal Grandmother     Prostate cancer Maternal Grandfather     Diabetes Paternal Grandmother     Breast cancer Maternal Aunt         Stage 1    Ovarian cancer Neg Hx     Uterine cancer Neg Hx     Colon cancer Neg Hx     Melanoma Neg Hx     Colon polyps Neg Hx        The following portions of the patient's history were reviewed and updated as appropriate: allergies, current medications, past family history, past medical history, past social history, past surgical history, and problem list.    Review of Systems   Constitutional: Negative.  Negative for chills and fever.   HENT: Negative.  Negative for sore throat.    Eyes: Negative.    Respiratory: Negative.     Cardiovascular: Negative.    Gastrointestinal: Negative.  Negative for abdominal pain, anorexia, constipation, diarrhea, nausea and vomiting.   Endocrine: Negative.    Genitourinary:  Positive for vaginal discharge. Negative for breast discharge, breast lump, breast pain, dysuria, flank pain,  frequency, hematuria, missed menses, pelvic pain and urgency.   Musculoskeletal: Negative.  Negative for back pain and joint pain.   Skin: Negative.  Negative for rash.   Allergic/Immunologic: Negative.    Neurological: Negative.    Hematological: Negative.    Psychiatric/Behavioral: Negative.         Objective   Physical Exam  Vitals and nursing note reviewed. Exam conducted with a chaperone present.   Constitutional:       General: She is not in acute distress.     Appearance: She is well-developed. She is not diaphoretic.   HENT:      Head: Normocephalic.      Right Ear: External ear normal.      Left Ear: External ear normal.      Nose: Nose normal.   Eyes:      General: No scleral icterus.        Right eye: No discharge.         Left eye: No discharge.      Conjunctiva/sclera: Conjunctivae normal.      Pupils: Pupils are equal, round, and reactive to light.   Neck:      Thyroid: No thyromegaly.      Vascular: No carotid bruit.      Trachea: No tracheal deviation.   Cardiovascular:      Rate and Rhythm: Normal rate and regular rhythm.      Pulses: Normal pulses.      Heart sounds: Normal heart sounds. No murmur heard.  Pulmonary:      Effort: Pulmonary effort is normal. No respiratory distress.      Breath sounds: Normal breath sounds. No wheezing.   Chest:   Breasts:     Breasts are symmetrical.      Right: Normal. No swelling, bleeding, inverted nipple, mass, nipple discharge, skin change or tenderness.      Left: Normal. No swelling, bleeding, inverted nipple, mass, nipple discharge, skin change or tenderness.   Abdominal:      General: Bowel sounds are normal. There is no distension.      Palpations: Abdomen is soft. There is no mass.      Tenderness: There is no abdominal tenderness. There is no right CVA tenderness, left CVA tenderness or guarding.      Hernia: No hernia is present. There is no hernia in the left inguinal area or right inguinal area.   Genitourinary:     General: Normal vulva.      Exam  position: Lithotomy position.      Labia:         Right: No rash, tenderness, lesion or injury.         Left: No rash, tenderness, lesion or injury.       Vagina: Normal. No signs of injury and foreign body. Vaginal discharge present. No erythema, tenderness or bleeding.      Cervix: Normal. Discharge present.      Uterus: Normal. Not enlarged, not fixed and not tender.       Adnexa: Right adnexa normal and left adnexa normal.        Right: No mass, tenderness or fullness.          Left: No mass, tenderness or fullness.        Rectum: Normal. No mass.      Comments: BSU normal  Urethral meatus  Normal  Perineum  Normal  Musculoskeletal:         General: No tenderness. Normal range of motion.      Cervical back: Normal range of motion and neck supple.   Lymphadenopathy:      Head:      Right side of head: No submental, submandibular, tonsillar, preauricular, posterior auricular or occipital adenopathy.      Left side of head: No submental, submandibular, tonsillar, preauricular, posterior auricular or occipital adenopathy.      Cervical: No cervical adenopathy.      Right cervical: No superficial, deep or posterior cervical adenopathy.     Left cervical: No superficial, deep or posterior cervical adenopathy.      Upper Body:      Right upper body: No supraclavicular, axillary or pectoral adenopathy.      Left upper body: No supraclavicular, axillary or pectoral adenopathy.      Lower Body: No right inguinal adenopathy. No left inguinal adenopathy.   Skin:     General: Skin is warm and dry.      Findings: No bruising, erythema or rash.   Neurological:      Mental Status: She is alert and oriented to person, place, and time.      Coordination: Coordination normal.   Psychiatric:         Mood and Affect: Mood normal.         Behavior: Behavior normal.         Thought Content: Thought content normal.         Judgment: Judgment normal.       PHQ-9 Depression Screening  Little interest or pleasure in doing things? 0-->not  at all   Feeling down, depressed, or hopeless? 0-->not at all   Trouble falling or staying asleep, or sleeping too much?     Feeling tired or having little energy?     Poor appetite or overeating?     Feeling bad about yourself - or that you are a failure or have let yourself or your family down?     Trouble concentrating on things, such as reading the newspaper or watching television?     Moving or speaking so slowly that other people could have noticed? Or the opposite - being so fidgety or restless that you have been moving around a lot more than usual?     Thoughts that you would be better off dead, or of hurting yourself in some way?     PHQ-9 Total Score 0   If you checked off any problems, how difficult have these problems made it for you to do your work, take care of things at home, or get along with other people?          Assessment & Plan   Diagnoses and all orders for this visit:    1. Well woman exam with routine gynecological exam (Primary)  Comments:  The patient presents to the office today for her well woman examination. The patient is established with her pcp and lab work is current. She is using Junel FE 1/20 for her ocp and her LMP was noted 2/26/2024. She reports she has been using Wegovy for weight loss and labs are monitored by her pcp for this.     2. Encounter for screening for cervical cancer  Comments:  The patient is sexually active and declines std screening today. BV panel added per patient request. Pelvic exam normal, pap smear completed.  Orders:  -     Liquid-based Pap Smear, Screening    3. Menopausal symptoms  Comments:  The patient reports that she is experiencing hot flashes, night sweats, and muscle cramps. She is currently on Junel FE 1/20 ocp. I did note a trend with her diastolic blood pressure being consistently elevated. She does desire continued contraception but also to pursue treatment for vasomotor symptoms. I recommended transitioning to a Mirena IUD and starting  Veozah. She is agreeable to this plan. One month of Veozah samples provided to patient today with informational sheet for enrollment. Mirena IUD ordered today with consent signed. She was instructed to continue her ocp until IUD is inserted. She will be contacted to schedule an appointment for insertion in the near future. She will return in 1 month to follow up on Veozah use.  Orders:  -     Fezolinetant (VEOZAH) 45 MG tablet; Take 1 tablet by mouth Daily for 90 days.  Dispense: 30 tablet; Refill: 0    4. Encounter for initial prescription of intrauterine contraceptive device  Comments:  The patient would like to transition to treatment for vasomotor symptoms. Patient noted to have recent elevated diastolic bp and recommended to transition to progesterone only contraception. She would like to proceed with the Mirena IUD. Device ordered and consent signed. She will be contacted in the near future to schedule this appointment.   Orders:  -     Levonorgestrel (MIRENA) 20 MCG/DAY intrauterine device IUD; To be inserted one time by prescriber. Route intrauterine.  Dispense: 1 each; Refill: 0         Normal GYN exam. Encouraged SBE, pt is aware how to do self breast exam and the importance of same. Discussed weight management and importance of maintaining a healthy weight. Discussed Vitamin D intake and the importance of adequate vitamin D for both bone health and a healthy immune system.  Discussed daily exercise and the importance of same, in regards to a healthy heart as well as helping to maintain her weight and improving her mental health.  Mammogram will be scheduled. Pap smear is done per ASCCP guidelines. HPV is done. Lab work up is up to date.      BMI is within normal parameters. No other follow-up for BMI required.      Mary Baird, APRN  8/22/2024

## 2024-08-26 LAB — HPV I/H RISK 4 DNA CVX QL PROBE+SIG AMP: NOT DETECTED

## 2024-08-28 DIAGNOSIS — B37.9 YEAST INFECTION: Primary | ICD-10-CM

## 2024-08-28 LAB
GEN CATEG CVX/VAG CYTO-IMP: NORMAL
LAB AP CASE REPORT: NORMAL
LAB AP GYN ADDITIONAL INFORMATION: NORMAL
LAB AP GYN OTHER FINDINGS: NORMAL
Lab: NORMAL
PATH INTERP SPEC-IMP: NORMAL
STAT OF ADQ CVX/VAG CYTO-IMP: NORMAL
TRICHOMONAS VAGINALIS PCR: NOT DETECTED

## 2024-08-28 RX ORDER — FLUCONAZOLE 150 MG/1
150 TABLET ORAL ONCE
Qty: 1 TABLET | Refills: 0 | Status: SHIPPED | OUTPATIENT
Start: 2024-08-28 | End: 2024-08-28

## 2024-09-12 ENCOUNTER — HOSPITAL ENCOUNTER (OUTPATIENT)
Dept: MAMMOGRAPHY | Facility: HOSPITAL | Age: 44
Discharge: HOME | End: 2024-09-12
Payer: COMMERCIAL

## 2024-09-12 ENCOUNTER — HOSPITAL ENCOUNTER (OUTPATIENT)
Dept: ULTRASOUND IMAGING | Facility: HOSPITAL | Age: 44
Discharge: HOME | End: 2024-09-12
Payer: COMMERCIAL

## 2024-09-12 DIAGNOSIS — Z12.31 ENCOUNTER FOR SCREENING MAMMOGRAM FOR MALIGNANT NEOPLASM OF BREAST: Primary | ICD-10-CM

## 2024-09-12 DIAGNOSIS — R92.8 ABNORMALITY OF LEFT BREAST ON SCREENING MAMMOGRAM: ICD-10-CM

## 2024-09-12 DIAGNOSIS — Z12.39 ENCOUNTER FOR BREAST CANCER SCREENING USING NON-MAMMOGRAM MODALITY: ICD-10-CM

## 2024-09-12 PROCEDURE — 77066 DX MAMMO INCL CAD BI: CPT

## 2024-09-12 PROCEDURE — G0279 TOMOSYNTHESIS, MAMMO: HCPCS

## 2024-09-12 PROCEDURE — 76642 ULTRASOUND BREAST LIMITED: CPT

## 2024-09-13 ENCOUNTER — PROCEDURE VISIT (OUTPATIENT)
Dept: OBSTETRICS AND GYNECOLOGY | Age: 44
End: 2024-09-13
Payer: COMMERCIAL

## 2024-09-13 VITALS
HEIGHT: 62 IN | DIASTOLIC BLOOD PRESSURE: 78 MMHG | BODY MASS INDEX: 22.71 KG/M2 | WEIGHT: 123.4 LBS | SYSTOLIC BLOOD PRESSURE: 124 MMHG

## 2024-09-13 DIAGNOSIS — Z30.430 ENCOUNTER FOR IUD INSERTION: Primary | ICD-10-CM

## 2024-09-13 LAB
B-HCG UR QL: NEGATIVE
EXPIRATION DATE: NORMAL
INTERNAL NEGATIVE CONTROL: NEGATIVE
INTERNAL POSITIVE CONTROL: POSITIVE
Lab: NORMAL

## 2024-09-13 NOTE — PROGRESS NOTES
Yvette Beltran is a 44 y.o. female.     History of Present Illness  The patient presents to the office today for Mirena IUD insertion.   Contraception  Visit:  Initial  Current contraceptive method:  Nothing  Compliance problems:  None  Treatments tried:  Nothing  Risk factors:  No known risk factors             Review of Systems   Constitutional: Negative.    HENT: Negative.     Eyes: Negative.    Respiratory: Negative.     Cardiovascular: Negative.    Gastrointestinal: Negative.    Endocrine: Negative.    Genitourinary: Negative.    Musculoskeletal: Negative.    Skin: Negative.    Allergic/Immunologic: Negative.    Neurological: Negative.    Hematological: Negative.    Psychiatric/Behavioral: Negative.         Objective   Physical Exam  Vitals and nursing note reviewed. Exam conducted with a chaperone present.   Constitutional:       Appearance: She is well-developed.   HENT:      Head: Normocephalic and atraumatic.   Abdominal:      General: There is no distension.      Palpations: Abdomen is soft.      Tenderness: There is no abdominal tenderness.   Genitourinary:     General: Normal vulva.      Exam position: Lithotomy position.      Labia:         Right: No rash, tenderness, lesion or injury.         Left: No rash, tenderness, lesion or injury.       Vagina: Normal. No vaginal discharge, erythema or tenderness.      Cervix: No cervical motion tenderness, discharge or friability.      Uterus: Not deviated, not enlarged and not tender.       Adnexa:         Right: No mass, tenderness or fullness.          Left: No mass, tenderness or fullness.     Skin:     General: Skin is warm and dry.   Neurological:      Mental Status: She is alert and oriented to person, place, and time.   Psychiatric:         Behavior: Behavior normal.         Thought Content: Thought content normal.         Judgment: Judgment normal.       Assessment & Plan   Diagnoses and all orders for this visit:    1. Encounter for IUD  "insertion (Primary)  Comments:  Patient presents to the office today for Mirena IUD insertion. UPT in office negative. After care discussed w/ precautions. 4 week f/u for us/ov.   Orders:  -     POC Pregnancy, Urine      Amanda Beltran is a 44 y.o. female  is here for IUD insertion.  Last menstrual period was 2024.  Her last Pap smear was 2024 and normal.  She has no history of cervical dysplasia.    /78   Ht 157.5 cm (62.01\")   Wt 56 kg (123 lb 6.4 oz)   LMP 2024 (Exact Date)   BMI 22.56 kg/m²      A urine pregnancy test in the office today is negative.    We have discussed the IUD, common side effects, and potential adverse events like uterine perforation, infection, or expulsion.  She has signed the consent form after answering her questions. A verbal timeout was completed with myself and the patient prior to the beginning of the procedure to verify correct patient, , and procedure to be completed. All information was verified correctly.     MIRENA IUD lot number [id number] was placed today.  The cervix was visualized and a sample was obtained for gonorrhea and chlamydia testing. The cervix was then cleansed with BETADINE swabs.  The anterior lip was grasped with a single-tooth tenaculum.  The uterus sounded to 8 cm.  The IUD was placed at the uterine fundus using sterile technique in a standard fashion.  The applicator was removed and the strings trimmed to 3 cm length.  The tenaculum site was made hemostatic with silver nitrate sticks. She tolerated the procedure well.    Assessment: IUD placement.    Amanda Beltran will return in one month for an IUD check.  She is given instructions to call if she has any fevers, heavy bleeding, severe pain, or nausea.         BMI is within normal parameters. No other follow-up for BMI required.       SCOTT Pollock   "

## 2024-09-18 LAB
A VAGINAE DNA VAG QL NAA+PROBE: NORMAL SCORE
BVAB2 DNA VAG QL NAA+PROBE: NORMAL SCORE
C ALBICANS DNA VAG QL NAA+PROBE: NEGATIVE
C GLABRATA DNA VAG QL NAA+PROBE: NEGATIVE
C TRACH DNA SPEC QL NAA+PROBE: NEGATIVE
HSV1 DNA SPEC QL NAA+PROBE: NEGATIVE
HSV2 DNA SPEC QL NAA+PROBE: NEGATIVE
MEGA1 DNA VAG QL NAA+PROBE: NORMAL SCORE
N GONORRHOEA DNA VAG QL NAA+PROBE: NEGATIVE
T VAGINALIS DNA VAG QL NAA+PROBE: NEGATIVE

## 2024-10-09 ENCOUNTER — OFFICE VISIT (OUTPATIENT)
Dept: OBSTETRICS AND GYNECOLOGY | Age: 44
End: 2024-10-09
Payer: COMMERCIAL

## 2024-10-09 VITALS
BODY MASS INDEX: 21.16 KG/M2 | SYSTOLIC BLOOD PRESSURE: 98 MMHG | DIASTOLIC BLOOD PRESSURE: 66 MMHG | WEIGHT: 115 LBS | HEIGHT: 62 IN

## 2024-10-09 DIAGNOSIS — Z30.431 SURVEILLANCE OF (INTRAUTERINE) CONTRACEPTIVE DEVICE: Primary | ICD-10-CM

## 2024-10-09 DIAGNOSIS — N95.1 MENOPAUSAL SYMPTOMS: ICD-10-CM

## 2024-10-09 NOTE — PROGRESS NOTES
Chief Complaint   Patient presents with    Contraception    Blood in Urine     Pt is here for a one month IUD check with US.  Pt c/o still having some bleeding as well as daily headaches.         History:  Amanda Beltran is a 44 y.o. female who presents today for follow-up for evaluation of the above:  The patient presents to the office today for her 4 week follow up after IUD insertion. Patient reports some intermittent spotting and has noticed headaches occurring in the afternoon since IUD was placed.        ROS:  Review of Systems   Constitutional: Negative.    HENT: Negative.     Eyes: Negative.    Respiratory: Negative.     Cardiovascular: Negative.    Gastrointestinal: Negative.    Endocrine: Negative.    Genitourinary:  Positive for vaginal bleeding (intermittent spotting with IUD in place).   Musculoskeletal: Negative.    Skin: Negative.    Allergic/Immunologic: Negative.    Neurological: Negative.    Hematological: Negative.    Psychiatric/Behavioral: Negative.         Ms. Beltran  reports that she has never smoked. She has never used smokeless tobacco. She reports current alcohol use of about 2.0 standard drinks of alcohol per week. She reports that she does not use drugs.      Current Outpatient Medications:     cholecalciferol (VITAMIN D3) 1000 UNITS tablet, Take 3 tablets by mouth Daily., Disp: , Rfl:     diazePAM (VALIUM) 2 MG tablet, Take 1 tablet by mouth Every 12 (Twelve) Hours As Needed for Anxiety., Disp: , Rfl:     DULoxetine (CYMBALTA) 60 MG capsule, Take 1 capsule by mouth Daily., Disp: , Rfl:     Fezolinetant (VEOZAH) 45 MG tablet, Take 1 tablet by mouth Daily for 90 days., Disp: 30 tablet, Rfl: 0    fluticasone (FLONASE) 50 MCG/ACT nasal spray, Flonase Allergy Relief 50 mcg/actuation nasal spray,suspension  Inhale 2 sprays every day by intranasal route as needed., Disp: , Rfl:     Levonorgestrel (MIRENA) 20 MCG/DAY intrauterine device IUD, To be inserted one time by prescriber. Route  "intrauterine., Disp: 1 each, Rfl: 0    Loratadine 10 MG capsule, Take 1 capsule by mouth Daily., Disp: , Rfl:     Multiple Vitamins-Minerals (MULTIVITAMIN WITH MINERALS) tablet tablet, Take 1 tablet by mouth Daily., Disp: , Rfl:     ondansetron ODT (ZOFRAN-ODT) 4 MG disintegrating tablet, TAKE ONE TABLET BY MOUTH EVERY 6-8HRS AS NEEDED, Disp: , Rfl:     propranolol (INDERAL) 20 MG tablet, propranolol 20 mg tablet  TAKE 1 TABLET TWICE A DAY BY ORAL ROUTE AS NEEDED., Disp: , Rfl:     Semaglutide-Weight Management (Wegovy) 1 MG/0.5ML solution auto-injector, Inject 0.5 mL under the skin into the appropriate area as directed 1 (One) Time Per Week., Disp: 2 mL, Rfl: 1    valACYclovir (VALTREX) 1000 MG tablet, Take 1 tablet by mouth 2 (Two) Times a Day As Needed (cold sore)., Disp: , Rfl:     Norethindrone Acet-Ethinyl Est (JUNEL 1/20 PO), Take  by mouth. (Patient not taking: Reported on 10/9/2024), Disp: , Rfl:     Scopolamine 1 MG/3DAYS patch, Apply 1mg patch every 3 days as needed for dizziness (Patient not taking: Reported on 10/9/2024), Disp: , Rfl:       OBJECTIVE:  BP 98/66   Ht 157.5 cm (62\")   Wt 52.2 kg (115 lb)   BMI 21.03 kg/m²    Physical Exam  Vitals and nursing note reviewed.   Constitutional:       General: She is not in acute distress.     Appearance: Normal appearance. She is well-developed. She is not ill-appearing or diaphoretic.   HENT:      Head: Normocephalic and atraumatic.   Pulmonary:      Effort: Pulmonary effort is normal. No respiratory distress.   Abdominal:      General: There is no distension.      Palpations: Abdomen is soft.      Tenderness: There is no abdominal tenderness.   Genitourinary:     Vagina: Normal.   Musculoskeletal:         General: No deformity.      Cervical back: Normal range of motion.   Skin:     General: Skin is warm and dry.      Coloration: Skin is not pale.   Neurological:      General: No focal deficit present.      Mental Status: She is alert and oriented to " person, place, and time.   Psychiatric:         Mood and Affect: Mood normal.         Behavior: Behavior normal.         Thought Content: Thought content normal.         Judgment: Judgment normal.       Assessment/Plan    Diagnoses and all orders for this visit:    1. Surveillance of (intrauterine) contraceptive device (Primary)  Comments:  Patient presents to the office today for her 4 week follow up after IUD insertion. Patient reports still having intermittent spotting. Ultrasound today reveals IUD in normal position; normal ultrasound. She has noticed headaches mostly occurring in the afternoon since insertion. Discussed with patient that this could be the form of progesterone contained with Mirena IUD. Patient will follow up with her pcp if this continues to be an issue.     2. Menopausal symptoms  Comments:  Patient doing well with Veozah use. Will be due for 3 month CMP December. Patient has lab work scheduled with pcp. Will obtain results to monitor liver function.          An After Visit Summary was printed and given to the patient at discharge.  Return if symptoms worsen or fail to improve, for Recheck. Sooner if problems arise.          SCOTT Roland

## 2024-10-28 DIAGNOSIS — N95.1 MENOPAUSAL SYMPTOMS: ICD-10-CM

## 2025-01-27 DIAGNOSIS — N95.1 MENOPAUSAL SYMPTOMS: ICD-10-CM

## 2025-01-27 NOTE — TELEPHONE ENCOUNTER
Received refill request on Veozah 45 mg 90 day script. Last appointment 10/9/24 next appointment 8/25/25. Medication pended.

## 2025-05-08 RX ORDER — FEZOLINETANT 45 MG/1
45 TABLET, FILM COATED ORAL DAILY
Qty: 30 TABLET | Refills: 0 | COMMUNITY
Start: 2025-05-08

## 2025-05-08 RX ORDER — FEZOLINETANT 45 MG/1
1 TABLET, FILM COATED ORAL DAILY
COMMUNITY
End: 2025-05-08 | Stop reason: SDUPTHER

## 2025-06-09 ENCOUNTER — OFFICE VISIT (OUTPATIENT)
Age: 45
End: 2025-06-09
Payer: COMMERCIAL

## 2025-06-09 VITALS
BODY MASS INDEX: 21.44 KG/M2 | WEIGHT: 121 LBS | HEIGHT: 63 IN | DIASTOLIC BLOOD PRESSURE: 70 MMHG | SYSTOLIC BLOOD PRESSURE: 116 MMHG

## 2025-06-09 DIAGNOSIS — N95.1 MENOPAUSAL SYMPTOMS: Primary | ICD-10-CM

## 2025-06-09 DIAGNOSIS — N95.1 MENOPAUSAL SYMPTOMS: ICD-10-CM

## 2025-06-09 DIAGNOSIS — Z78.0 MENOPAUSE: Primary | ICD-10-CM

## 2025-06-09 DIAGNOSIS — Z51.81 MEDICATION MONITORING ENCOUNTER: ICD-10-CM

## 2025-06-09 PROCEDURE — 99213 OFFICE O/P EST LOW 20 MIN: CPT

## 2025-06-09 RX ORDER — PIMECROLIMUS 10 MG/G
CREAM TOPICAL
COMMUNITY

## 2025-06-09 RX ORDER — PREDNISOLONE ACETATE 10 MG/ML
SUSPENSION/ DROPS OPHTHALMIC
COMMUNITY
Start: 2025-05-21

## 2025-06-09 RX ORDER — FEZOLINETANT 45 MG/1
45 TABLET, FILM COATED ORAL DAILY
Qty: 30 TABLET | Refills: 12 | COMMUNITY
Start: 2025-06-09 | End: 2025-06-09 | Stop reason: SDUPTHER

## 2025-06-09 RX ORDER — FEZOLINETANT 45 MG/1
45 TABLET, FILM COATED ORAL DAILY
Qty: 30 TABLET | Refills: 12 | COMMUNITY
Start: 2025-06-09

## 2025-06-09 NOTE — PROGRESS NOTES
Chief Complaint   Patient presents with    Med Management     Patient is here today for a 3 month GYN follow up with labs regarding refills on Veozah. Patient denies any other problems or concerns.          History:  Amanda Beltran is a 44 y.o. female who presents today for follow-up for evaluation of the above:  She presents to the office today for follow up on Veozah use.        ROS:  Review of Systems   Constitutional: Negative.    HENT: Negative.     Eyes: Negative.    Respiratory: Negative.     Cardiovascular: Negative.    Gastrointestinal: Negative.    Endocrine: Negative.    Genitourinary: Negative.    Musculoskeletal: Negative.    Skin: Negative.    Allergic/Immunologic: Negative.    Neurological: Negative.    Hematological: Negative.    Psychiatric/Behavioral: Negative.         Ms. Beltran  reports that she has never smoked. She has never used smokeless tobacco. She reports current alcohol use of about 2.0 standard drinks of alcohol per week. She reports that she does not use drugs.      Current Outpatient Medications:     cholecalciferol (VITAMIN D3) 1000 UNITS tablet, Take 3 tablets by mouth Daily., Disp: , Rfl:     diazePAM (VALIUM) 2 MG tablet, Take 1 tablet by mouth Every 12 (Twelve) Hours As Needed for Anxiety., Disp: , Rfl:     DULoxetine (CYMBALTA) 60 MG capsule, Take 1 capsule by mouth Daily., Disp: , Rfl:     Fezolinetant (Veozah) 45 MG tablet, Take 1 tablet by mouth Daily., Disp: 30 tablet, Rfl: 12    fluticasone (FLONASE) 50 MCG/ACT nasal spray, Flonase Allergy Relief 50 mcg/actuation nasal spray,suspension  Inhale 2 sprays every day by intranasal route as needed., Disp: , Rfl:     Loratadine 10 MG capsule, Take 1 capsule by mouth Daily., Disp: , Rfl:     Multiple Vitamins-Minerals (MULTIVITAMIN WITH MINERALS) tablet tablet, Take 1 tablet by mouth Daily., Disp: , Rfl:     ondansetron ODT (ZOFRAN-ODT) 4 MG disintegrating tablet, TAKE ONE TABLET BY MOUTH EVERY 6-8HRS AS NEEDED, Disp: , Rfl:      "pimecrolimus (ELIDEL) 1 % cream, , Disp: , Rfl:     prednisoLONE acetate (PRED FORTE) 1 % ophthalmic suspension, Please see attached for detailed directions, Disp: , Rfl:     propranolol (INDERAL) 20 MG tablet, propranolol 20 mg tablet  TAKE 1 TABLET TWICE A DAY BY ORAL ROUTE AS NEEDED., Disp: , Rfl:     Semaglutide-Weight Management (Wegovy) 1 MG/0.5ML solution auto-injector, Inject 0.5 mL under the skin into the appropriate area as directed 1 (One) Time Per Week., Disp: 2 mL, Rfl: 0    valACYclovir (VALTREX) 1000 MG tablet, Take 1 tablet by mouth 2 (Two) Times a Day As Needed (cold sore)., Disp: , Rfl:     Levonorgestrel (MIRENA) 20 MCG/DAY intrauterine device IUD, To be inserted one time by prescriber. Route intrauterine., Disp: 1 each, Rfl: 0      OBJECTIVE:  /70   Ht 158.8 cm (62.5\")   Wt 54.9 kg (121 lb)   BMI 21.78 kg/m²    Physical Exam  Vitals and nursing note reviewed.   Constitutional:       General: She is not in acute distress.     Appearance: Normal appearance. She is not ill-appearing or diaphoretic.   HENT:      Head: Normocephalic and atraumatic.   Pulmonary:      Effort: Pulmonary effort is normal. No respiratory distress.   Musculoskeletal:         General: No deformity.      Cervical back: Normal range of motion.   Skin:     Coloration: Skin is not pale.   Neurological:      General: No focal deficit present.      Mental Status: She is alert.   Psychiatric:         Mood and Affect: Mood normal.         Behavior: Behavior normal.         Thought Content: Thought content normal.         Judgment: Judgment normal.         Assessment/Plan    Diagnoses and all orders for this visit:    1. Menopausal symptoms (Primary)  Comments:  She is doing well with use of Veozah and wishes to continue. Samples provided to her while in the office today to continue therapy. CMP will be completed today. She will return for her next office visit or sooner if needed.  Orders:  -     Fezolinetant (Veozah) 45 MG " tablet; Take 1 tablet by mouth Daily.  Dispense: 30 tablet; Refill: 12    2. Medication monitoring encounter  Comments:  CMP completed today for medication monitoring.  Orders:  -     Comprehensive Metabolic Panel          An After Visit Summary was printed and given to the patient at discharge.  No follow-ups on file. Sooner if problems arise.          Mary Baird, APRN

## 2025-06-09 NOTE — TELEPHONE ENCOUNTER
I have called the  program and the Veozah now needs to be send to [facility] in [place], [place]. I have saved this pharmacy is patients chart. And pended below.

## 2025-06-10 LAB
ALBUMIN SERPL-MCNC: 4.8 G/DL (ref 3.9–4.9)
ALP SERPL-CCNC: 47 IU/L (ref 44–121)
ALT SERPL-CCNC: 20 IU/L (ref 0–32)
AST SERPL-CCNC: 23 IU/L (ref 0–40)
BILIRUB SERPL-MCNC: 0.6 MG/DL (ref 0–1.2)
BUN SERPL-MCNC: 14 MG/DL (ref 6–24)
BUN/CREAT SERPL: 23 (ref 9–23)
CALCIUM SERPL-MCNC: 9.8 MG/DL (ref 8.7–10.2)
CHLORIDE SERPL-SCNC: 102 MMOL/L (ref 96–106)
CO2 SERPL-SCNC: 23 MMOL/L (ref 20–29)
CREAT SERPL-MCNC: 0.61 MG/DL (ref 0.57–1)
EGFRCR SERPLBLD CKD-EPI 2021: 114 ML/MIN/1.73
GLOBULIN SER CALC-MCNC: 1.9 G/DL (ref 1.5–4.5)
GLUCOSE SERPL-MCNC: 83 MG/DL (ref 70–99)
POTASSIUM SERPL-SCNC: 4.4 MMOL/L (ref 3.5–5.2)
PROT SERPL-MCNC: 6.7 G/DL (ref 6–8.5)
SODIUM SERPL-SCNC: 138 MMOL/L (ref 134–144)

## 2025-07-01 DIAGNOSIS — N95.1 MENOPAUSAL SYMPTOMS: ICD-10-CM

## 2025-07-01 RX ORDER — FEZOLINETANT 45 MG/1
45 TABLET, FILM COATED ORAL DAILY
Qty: 30 TABLET | Refills: 12 | COMMUNITY
Start: 2025-07-01

## 2025-08-21 ENCOUNTER — TRANSCRIBE ORDERS (OUTPATIENT)
Dept: ADMINISTRATIVE | Facility: HOSPITAL | Age: 45
End: 2025-08-21
Payer: COMMERCIAL

## 2025-08-21 DIAGNOSIS — R10.9 STOMACH PAIN: Primary | ICD-10-CM

## 2025-08-22 ENCOUNTER — HOSPITAL ENCOUNTER (OUTPATIENT)
Dept: CT IMAGING | Facility: HOSPITAL | Age: 45
Discharge: HOME | End: 2025-08-22
Payer: COMMERCIAL

## 2025-08-22 DIAGNOSIS — R10.9 STOMACH PAIN: ICD-10-CM

## 2025-08-22 PROCEDURE — 25510000001 IOPAMIDOL 61 % SOLUTION

## 2025-08-22 PROCEDURE — 74178 CT ABD&PLV WO CNTR FLWD CNTR: CPT

## 2025-08-22 RX ORDER — IOPAMIDOL 612 MG/ML
100 INJECTION, SOLUTION INTRAVASCULAR
Status: COMPLETED | OUTPATIENT
Start: 2025-08-22 | End: 2025-08-22

## 2025-08-22 RX ADMIN — IOPAMIDOL 100 ML: 612 INJECTION, SOLUTION INTRAVENOUS at 09:03

## 2025-08-25 ENCOUNTER — OFFICE VISIT (OUTPATIENT)
Age: 45
End: 2025-08-25
Payer: COMMERCIAL

## 2025-08-25 VITALS
SYSTOLIC BLOOD PRESSURE: 112 MMHG | DIASTOLIC BLOOD PRESSURE: 78 MMHG | WEIGHT: 122 LBS | HEIGHT: 63 IN | BODY MASS INDEX: 21.62 KG/M2

## 2025-08-25 DIAGNOSIS — T36.95XA ANTIBIOTIC-INDUCED YEAST INFECTION: ICD-10-CM

## 2025-08-25 DIAGNOSIS — Z12.4 ENCOUNTER FOR SCREENING FOR CERVICAL CANCER: ICD-10-CM

## 2025-08-25 DIAGNOSIS — Z12.31 ENCOUNTER FOR SCREENING MAMMOGRAM FOR BREAST CANCER: ICD-10-CM

## 2025-08-25 DIAGNOSIS — B37.9 ANTIBIOTIC-INDUCED YEAST INFECTION: ICD-10-CM

## 2025-08-25 DIAGNOSIS — Z01.419 WELL WOMAN EXAM WITH ROUTINE GYNECOLOGICAL EXAM: Primary | ICD-10-CM

## 2025-08-25 DIAGNOSIS — Z30.431 SURVEILLANCE OF (INTRAUTERINE) CONTRACEPTIVE DEVICE: ICD-10-CM

## 2025-08-25 PROCEDURE — 99396 PREV VISIT EST AGE 40-64: CPT

## 2025-08-25 PROCEDURE — G0123 SCREEN CERV/VAG THIN LAYER: HCPCS

## 2025-08-25 PROCEDURE — 99459 PELVIC EXAMINATION: CPT

## 2025-08-25 PROCEDURE — 87624 HPV HI-RISK TYP POOLED RSLT: CPT

## 2025-08-25 RX ORDER — SUMATRIPTAN 50 MG/1
TABLET, FILM COATED ORAL
COMMUNITY
Start: 2025-08-22

## 2025-08-25 RX ORDER — FLUCONAZOLE 100 MG/1
100 TABLET ORAL
Qty: 3 TABLET | Refills: 0 | Status: SHIPPED | OUTPATIENT
Start: 2025-08-25 | End: 2025-09-01

## 2025-08-26 LAB
GEN CATEG CVX/VAG CYTO-IMP: NORMAL
HPV I/H RISK 4 DNA CVX QL PROBE+SIG AMP: NOT DETECTED
LAB AP CASE REPORT: NORMAL
LAB AP GYN ADDITIONAL INFORMATION: NORMAL
LAB AP GYN OTHER FINDINGS: NORMAL
Lab: NORMAL
PATH INTERP SPEC-IMP: NORMAL
STAT OF ADQ CVX/VAG CYTO-IMP: NORMAL

## (undated) DEVICE — SENSR O2 OXIMAX FNGR A/ 18IN NONSTR

## (undated) DEVICE — TBG SMPL FLTR LINE NASL 02/C02 A/ BX/100

## (undated) DEVICE — MASK,OXYGEN,MED CONC,ADLT,7' TUB, UC: Brand: PENDING

## (undated) DEVICE — THE CHANNEL CLEANING BRUSH IS A NYLON FLEXI BRUSH ATTACHED TO A FLEXIBLE PLASTIC SHEATH DESIGNED TO SAFELY REMOVE DEBRIS FROM FLEXIBLE ENDOSCOPES.

## (undated) DEVICE — YANKAUER,BULB TIP WITH VENT: Brand: ARGYLE

## (undated) DEVICE — CUFF,BP,DISP,1 TUBE,ADULT,HP: Brand: MEDLINE

## (undated) DEVICE — Device: Brand: DEFENDO AIR/WATER/SUCTION AND BIOPSY VALVE